# Patient Record
Sex: FEMALE | URBAN - METROPOLITAN AREA
[De-identification: names, ages, dates, MRNs, and addresses within clinical notes are randomized per-mention and may not be internally consistent; named-entity substitution may affect disease eponyms.]

---

## 2023-04-09 ENCOUNTER — EMERGENCY (EMERGENCY)
Facility: HOSPITAL | Age: 28
LOS: 1 days | Discharge: HOME | End: 2023-04-09
Attending: EMERGENCY MEDICINE
Payer: COMMERCIAL

## 2023-04-09 VITALS
OXYGEN SATURATION: 96 % | RESPIRATION RATE: 18 BRPM | WEIGHT: 111.99 LBS | DIASTOLIC BLOOD PRESSURE: 73 MMHG | HEART RATE: 103 BPM | HEIGHT: 65 IN | TEMPERATURE: 101 F | SYSTOLIC BLOOD PRESSURE: 128 MMHG

## 2023-04-09 LAB
ALBUMIN SERPL ELPH-MCNC: 4.6 G/DL — SIGNIFICANT CHANGE UP (ref 3.3–5)
ALP SERPL-CCNC: 65 U/L — SIGNIFICANT CHANGE UP (ref 40–120)
ALT FLD-CCNC: 10 U/L — SIGNIFICANT CHANGE UP (ref 10–45)
ANION GAP SERPL CALC-SCNC: 12 MMOL/L — SIGNIFICANT CHANGE UP (ref 5–17)
APPEARANCE UR: ABNORMAL
APTT BLD: 27.5 SEC — SIGNIFICANT CHANGE UP (ref 27.5–35.5)
AST SERPL-CCNC: 15 U/L — SIGNIFICANT CHANGE UP (ref 10–40)
BACTERIA # UR AUTO: ABNORMAL
BASE EXCESS BLDV CALC-SCNC: 2.9 MMOL/L — SIGNIFICANT CHANGE UP (ref -2–3)
BASOPHILS # BLD AUTO: 0.04 K/UL — SIGNIFICANT CHANGE UP (ref 0–0.2)
BASOPHILS NFR BLD AUTO: 0.2 % — SIGNIFICANT CHANGE UP (ref 0–2)
BILIRUB SERPL-MCNC: 1.4 MG/DL — HIGH (ref 0.2–1.2)
BILIRUB UR-MCNC: NEGATIVE — SIGNIFICANT CHANGE UP
BUN SERPL-MCNC: 12 MG/DL — SIGNIFICANT CHANGE UP (ref 7–23)
CA-I SERPL-SCNC: 1.2 MMOL/L — SIGNIFICANT CHANGE UP (ref 1.15–1.33)
CALCIUM SERPL-MCNC: 9.6 MG/DL — SIGNIFICANT CHANGE UP (ref 8.4–10.5)
CHLORIDE BLDV-SCNC: 100 MMOL/L — SIGNIFICANT CHANGE UP (ref 96–108)
CHLORIDE SERPL-SCNC: 98 MMOL/L — SIGNIFICANT CHANGE UP (ref 96–108)
CO2 BLDV-SCNC: 29 MMOL/L — HIGH (ref 22–26)
CO2 SERPL-SCNC: 26 MMOL/L — SIGNIFICANT CHANGE UP (ref 22–31)
COLOR SPEC: YELLOW — SIGNIFICANT CHANGE UP
CREAT SERPL-MCNC: 0.75 MG/DL — SIGNIFICANT CHANGE UP (ref 0.5–1.3)
DIFF PNL FLD: ABNORMAL
EGFR: 112 ML/MIN/1.73M2 — SIGNIFICANT CHANGE UP
EOSINOPHIL # BLD AUTO: 0.04 K/UL — SIGNIFICANT CHANGE UP (ref 0–0.5)
EOSINOPHIL NFR BLD AUTO: 0.2 % — SIGNIFICANT CHANGE UP (ref 0–6)
EPI CELLS # UR: 17 /HPF — HIGH
GAS PNL BLDV: 133 MMOL/L — LOW (ref 136–145)
GAS PNL BLDV: SIGNIFICANT CHANGE UP
GLUCOSE BLDV-MCNC: 88 MG/DL — SIGNIFICANT CHANGE UP (ref 70–99)
GLUCOSE SERPL-MCNC: 89 MG/DL — SIGNIFICANT CHANGE UP (ref 70–99)
GLUCOSE UR QL: NEGATIVE — SIGNIFICANT CHANGE UP
HCO3 BLDV-SCNC: 28 MMOL/L — SIGNIFICANT CHANGE UP (ref 22–29)
HCT VFR BLD CALC: 41.9 % — SIGNIFICANT CHANGE UP (ref 34.5–45)
HCT VFR BLDA CALC: 42 % — SIGNIFICANT CHANGE UP (ref 34.5–46.5)
HGB BLD CALC-MCNC: 14.1 G/DL — SIGNIFICANT CHANGE UP (ref 11.7–16.1)
HGB BLD-MCNC: 13.9 G/DL — SIGNIFICANT CHANGE UP (ref 11.5–15.5)
HYALINE CASTS # UR AUTO: 10 /LPF — HIGH (ref 0–2)
IMM GRANULOCYTES NFR BLD AUTO: 0.5 % — SIGNIFICANT CHANGE UP (ref 0–0.9)
INR BLD: 1.14 RATIO — SIGNIFICANT CHANGE UP (ref 0.88–1.16)
KETONES UR-MCNC: ABNORMAL
LACTATE BLDV-MCNC: 1.2 MMOL/L — SIGNIFICANT CHANGE UP (ref 0.5–2)
LEUKOCYTE ESTERASE UR-ACNC: ABNORMAL
LYMPHOCYTES # BLD AUTO: 0.6 K/UL — LOW (ref 1–3.3)
LYMPHOCYTES # BLD AUTO: 3.4 % — LOW (ref 13–44)
MCHC RBC-ENTMCNC: 30.9 PG — SIGNIFICANT CHANGE UP (ref 27–34)
MCHC RBC-ENTMCNC: 33.2 GM/DL — SIGNIFICANT CHANGE UP (ref 32–36)
MCV RBC AUTO: 93.1 FL — SIGNIFICANT CHANGE UP (ref 80–100)
MONOCYTES # BLD AUTO: 1.13 K/UL — HIGH (ref 0–0.9)
MONOCYTES NFR BLD AUTO: 6.4 % — SIGNIFICANT CHANGE UP (ref 2–14)
NEUTROPHILS # BLD AUTO: 15.64 K/UL — HIGH (ref 1.8–7.4)
NEUTROPHILS NFR BLD AUTO: 89.3 % — HIGH (ref 43–77)
NITRITE UR-MCNC: POSITIVE
NRBC # BLD: 0 /100 WBCS — SIGNIFICANT CHANGE UP (ref 0–0)
PCO2 BLDV: 43 MMHG — HIGH (ref 39–42)
PH BLDV: 7.42 — SIGNIFICANT CHANGE UP (ref 7.32–7.43)
PH UR: 6.5 — SIGNIFICANT CHANGE UP (ref 5–8)
PLATELET # BLD AUTO: 269 K/UL — SIGNIFICANT CHANGE UP (ref 150–400)
PO2 BLDV: 42 MMHG — SIGNIFICANT CHANGE UP (ref 25–45)
POTASSIUM BLDV-SCNC: 3.7 MMOL/L — SIGNIFICANT CHANGE UP (ref 3.5–5.1)
POTASSIUM SERPL-MCNC: 3.8 MMOL/L — SIGNIFICANT CHANGE UP (ref 3.5–5.3)
POTASSIUM SERPL-SCNC: 3.8 MMOL/L — SIGNIFICANT CHANGE UP (ref 3.5–5.3)
PROT SERPL-MCNC: 7.3 G/DL — SIGNIFICANT CHANGE UP (ref 6–8.3)
PROT UR-MCNC: ABNORMAL
PROTHROM AB SERPL-ACNC: 13.3 SEC — SIGNIFICANT CHANGE UP (ref 10.5–13.4)
RAPID RVP RESULT: SIGNIFICANT CHANGE UP
RBC # BLD: 4.5 M/UL — SIGNIFICANT CHANGE UP (ref 3.8–5.2)
RBC # FLD: 13 % — SIGNIFICANT CHANGE UP (ref 10.3–14.5)
RBC CASTS # UR COMP ASSIST: 4 /HPF — SIGNIFICANT CHANGE UP (ref 0–4)
SAO2 % BLDV: 70.8 % — SIGNIFICANT CHANGE UP (ref 67–88)
SARS-COV-2 RNA SPEC QL NAA+PROBE: SIGNIFICANT CHANGE UP
SODIUM SERPL-SCNC: 136 MMOL/L — SIGNIFICANT CHANGE UP (ref 135–145)
SP GR SPEC: 1.02 — SIGNIFICANT CHANGE UP (ref 1.01–1.02)
UROBILINOGEN FLD QL: NEGATIVE — SIGNIFICANT CHANGE UP
WBC # BLD: 17.54 K/UL — HIGH (ref 3.8–10.5)
WBC # FLD AUTO: 17.54 K/UL — HIGH (ref 3.8–10.5)
WBC UR QL: 111 /HPF — HIGH (ref 0–5)

## 2023-04-09 PROCEDURE — 71045 X-RAY EXAM CHEST 1 VIEW: CPT | Mod: 26

## 2023-04-09 PROCEDURE — 99223 1ST HOSP IP/OBS HIGH 75: CPT

## 2023-04-09 PROCEDURE — 74177 CT ABD & PELVIS W/CONTRAST: CPT | Mod: 26,MA

## 2023-04-09 RX ORDER — PIPERACILLIN AND TAZOBACTAM 4; .5 G/20ML; G/20ML
3.38 INJECTION, POWDER, LYOPHILIZED, FOR SOLUTION INTRAVENOUS ONCE
Refills: 0 | Status: COMPLETED | OUTPATIENT
Start: 2023-04-09 | End: 2023-04-09

## 2023-04-09 RX ORDER — ACETAMINOPHEN 500 MG
975 TABLET ORAL ONCE
Refills: 0 | Status: COMPLETED | OUTPATIENT
Start: 2023-04-09 | End: 2023-04-09

## 2023-04-09 RX ORDER — SODIUM CHLORIDE 9 MG/ML
1000 INJECTION, SOLUTION INTRAVENOUS ONCE
Refills: 0 | Status: COMPLETED | OUTPATIENT
Start: 2023-04-09 | End: 2023-04-09

## 2023-04-09 RX ORDER — KETOROLAC TROMETHAMINE 30 MG/ML
15 SYRINGE (ML) INJECTION ONCE
Refills: 0 | Status: DISCONTINUED | OUTPATIENT
Start: 2023-04-09 | End: 2023-04-09

## 2023-04-09 RX ORDER — SODIUM CHLORIDE 9 MG/ML
1000 INJECTION, SOLUTION INTRAVENOUS
Refills: 0 | Status: DISCONTINUED | OUTPATIENT
Start: 2023-04-09 | End: 2023-04-13

## 2023-04-09 RX ADMIN — SODIUM CHLORIDE 150 MILLILITER(S): 9 INJECTION, SOLUTION INTRAVENOUS at 23:28

## 2023-04-09 RX ADMIN — SODIUM CHLORIDE 1000 MILLILITER(S): 9 INJECTION, SOLUTION INTRAVENOUS at 17:51

## 2023-04-09 RX ADMIN — PIPERACILLIN AND TAZOBACTAM 3.38 GRAM(S): 4; .5 INJECTION, POWDER, LYOPHILIZED, FOR SOLUTION INTRAVENOUS at 17:35

## 2023-04-09 RX ADMIN — PIPERACILLIN AND TAZOBACTAM 200 GRAM(S): 4; .5 INJECTION, POWDER, LYOPHILIZED, FOR SOLUTION INTRAVENOUS at 17:05

## 2023-04-09 RX ADMIN — PIPERACILLIN AND TAZOBACTAM 25 GRAM(S): 4; .5 INJECTION, POWDER, LYOPHILIZED, FOR SOLUTION INTRAVENOUS at 19:35

## 2023-04-09 RX ADMIN — Medication 975 MILLIGRAM(S): at 17:04

## 2023-04-09 RX ADMIN — Medication 15 MILLIGRAM(S): at 20:38

## 2023-04-09 RX ADMIN — Medication 975 MILLIGRAM(S): at 17:34

## 2023-04-09 NOTE — ED CDU PROVIDER DISPOSITION NOTE - NSFOLLOWUPINSTRUCTIONS_ED_ALL_ED_FT
1) Follow-up with your Primary Medical Doctor or referred doctor. Call today / next business day for prompt follow-up.  2) Return to Emergency room for any worsening or persistent pain, weakness, fever, or any other concerning symptoms.  3) See attached instruction sheets for additional information, including information regarding signs and symptoms to look out for, reasons to seek immediate care and other important instructions.  4) Follow-up with any specialists as discussed / noted as well. 1. Follow-up with your Primary Medical Doctor within 2-3 days.   2. Rest. Stay hydrated with plenty of fluids.   3. Take Ibuprofen (i.e. Motrin, Advil) 600mg every 8 hrs for pain as needed. Take with food.   May alternate with Acetminophen (Tylenol) 650mg every 6 hours for pain as needed.  4. Begin taking Vantin 200 mg twice daily for 10 days.   5. Return to the emergency department if you have worsening pain, fevers, vomiting, inability to eat or drink or any other concerning symptoms.

## 2023-04-09 NOTE — ED PROVIDER NOTE - OBJECTIVE STATEMENT
27-year-old female history of lupus not currently on any medication (was on hydroxychloroquine and getting IVIG however has not had IVIG in 3 months) presenting to the emergency department for the evaluation of right sided flank and back pain and periumbilical pain beginning last night.  Patient reports that the pain has been constant, progressive.  Positive fever.  Reports nausea without vomiting, constipation (last BM 5 days ago).  Patient denies urinary discomfort, urinary frequency, unusual vaginal discharge.  Patient with an IUD sexually active with spouse.

## 2023-04-09 NOTE — ED ADULT NURSE NOTE - OBJECTIVE STATEMENT
A 26 y/o female axo x3, brought in from home complaining of abd pain and fever. PT report pain starting last night in RLQ that radiated to RUQ/Umbilicus and Right flank/back pain. PT reports having nausea w/o vomiting for 1 day and fever x1 day. Last BM was Tuesday. Past medical hx of SLE on IVIG Tx. PT denies headache, chest pain, SOB, diarrhea, urinary symptoms. PT breathing spontaneous, abd nondistended but tender, positive rebound tenderness @ right flank. PT reports vaginal spotting with hx IUD placement. PT placed on cardiac monitor with PIV placed, labs drawn and awaiting results. A 28 y/o female axo x3, brought in from home complaining of abd pain and fever. PT report pain starting last night in RLQ that radiated to RUQ/Umbilicus and Right flank/back pain. PT reports having nausea w/o vomiting for 1 day and fever x1 day. Last BM was Tuesday. Past medical hx of SLE on IVIG Tx. PT denies headache, chest pain, SOB, diarrhea, urinary symptoms. PT breathing spontaneous, abd nondistended but tender, positive rebound tenderness @ right flank. PT reports vaginal spotting with hx IUD placement. PT placed on cardiac monitor with PIV placed, labs drawn and awaiting results.

## 2023-04-09 NOTE — ED CDU PROVIDER INITIAL DAY NOTE - OBJECTIVE STATEMENT
27-year-old female history of lupus not currently on any medication (was on hydroxychloroquine and getting IVIG however has not had IVIG in 3 months) presenting to the emergency department for the evaluation of right sided flank and back pain and periumbilical pain beginning last night.  Patient reports that the pain has been constant, progressive.  Positive fever.  Reports nausea without vomiting, constipation (last BM 5 days ago).  Patient denies urinary discomfort, urinary frequency, unusual vaginal discharge.  Patient with an IUD sexually active with spouse.  In ED, patient febrile, tachycardiac w/ laboratory significant for leukocytosis. CT abdomen/pelvis w/ contrast showed Acute right pyelonephritis and ureteritis. Pt started on IV abx and sent to CDU for frequent reeval, vitals q 4hrs, iv abx, pain control.

## 2023-04-09 NOTE — ED ADULT NURSE NOTE - NSIMPLEMENTINTERV_GEN_ALL_ED
Implemented All Universal Safety Interventions:  [place] to call system. Call bell, personal items and telephone within reach. Instruct patient to call for assistance. Room bathroom lighting operational. Non-slip footwear when patient is off stretcher. Physically safe environment: no spills, clutter or unnecessary equipment. Stretcher in lowest position, wheels locked, appropriate side rails in place. Implemented All Universal Safety Interventions:  [place] to call system. Call bell, personal items and telephone within reach. Instruct patient to call for assistance. Room bathroom lighting operational. Non-slip footwear when patient is off stretcher. Physically safe environment: no spills, clutter or unnecessary equipment. Stretcher in lowest position, wheels locked, appropriate side rails in place. Implemented All Universal Safety Interventions:  [place] to call system. Call bell, personal items and telephone within reach. Instruct patient to call for assistance. Room bathroom lighting operational. Non-slip footwear when patient is off stretcher. Physically safe environment: no spills, clutter or unnecessary equipment. Stretcher in lowest position, wheels locked, appropriate side rails in place.

## 2023-04-09 NOTE — ED CDU PROVIDER DISPOSITION NOTE - CLINICAL COURSE
27-year-old female history of lupus not currently on any medication (was on hydroxychloroquine and getting IVIG however has not had IVIG in 3 months) presenting to the emergency department for the evaluation of right sided flank and back pain and periumbilical pain beginning last night.  Patient reports that the pain has been constant, progressive.  Positive fever.  Reports nausea without vomiting, constipation (last BM 5 days ago).  Patient denies urinary discomfort, urinary frequency, unusual vaginal discharge.  Patient with an IUD sexually active with spouse.  In ED, patient febrile, tachycardiac w/ laboratory significant for leukocytosis. CT abdomen/pelvis w/ contrast showed Acute right pyelonephritis and ureteritis. Pt started on IV abx and sent to CDU for frequent reeval, vitals q 4hrs, iv abx, pain control. 27-year-old female history of lupus not currently on any medication (was on hydroxychloroquine and getting IVIG however has not had IVIG in 3 months) presenting to the emergency department for the evaluation of right sided flank and back pain and periumbilical pain beginning last night.  Patient reports that the pain has been constant, progressive.  Positive fever.  Reports nausea without vomiting, constipation (last BM 5 days ago).  Patient denies urinary discomfort, urinary frequency, unusual vaginal discharge.  Patient with an IUD sexually active with spouse.  In ED, patient febrile, tachycardiac w/ laboratory significant for leukocytosis. CT abdomen/pelvis w/ contrast showed Acute right pyelonephritis and ureteritis. Pt started on IV abx and sent to CDU for frequent reeval, vitals q 4hrs, iv abx, pain control.     While in CDU patient had multiple doses of IV ABX and pain control. Symptoms improved. Stable for discharge.

## 2023-04-09 NOTE — ED CLERICAL - DIVISION
[facility]... [facility]... [facility]...

## 2023-04-09 NOTE — ED PROVIDER NOTE - NS ED ATTENDING STATEMENT MOD
Attending with This was a shared visit with the FITO. I reviewed and verified the documentation and independently performed the documented:

## 2023-04-09 NOTE — ED CDU PROVIDER DISPOSITION NOTE - PATIENT PORTAL LINK FT
You can access the FollowMyHealth Patient Portal offered by [facility] by registering at the following website: [email or web address]. By joining [employer]’s FollowMyHealth portal, you will also be able to view your health information using other applications (apps) compatible with our system. You can access the FollowMyHealth Patient Portal offered by [facility] by registering at the following website: [email or web address]. By joining [employer]’s FollowMyHealth portal, you will also be able to view your health information using other applications (apps) compatible with our system. You can access the FollowMyHealth Patient Portal offered by [facility] by registering at the following website: [email or web address]. By joining [employer]’s FollowMyHealth portal, you will also be able to view your health information using other applications (apps) compatible with our system.

## 2023-04-09 NOTE — ED PROVIDER NOTE - ATTENDING CONTRIBUTION TO CARE
Attending Statement (JOSE Perea MD):    HPI: 28y/o F with h/o SLE (reports having ivig in past, not on any other medications, last ivig ~3 mo/ago), presenting with abdominal pain since last night; reports pain began in the mid upper abdomen (indicates periumbilical to epigastric region) and has now moved to the right lower abdomen with radiation to the right flank/lower back. No urinary symptoms. No vomiting/diarrhea. +fever.    Review of Systems:  -General: +fever +chills  -ENT: no congestion, no difficulty swallowing  -Pulmonary: no cough, no shortness of breath  -Cardiac: no chest pain, no palpitations  -Gastrointestinal: +abdominal pain,  no vomiting, and no diarrhea.  -Genitourinary: no blood or pain with urination  -Musculoskeletal: no back or neck pain  -Skin: no rashes  -Endocrine: No h/o diabetes   -Neurologic: No new weakness or numbness in extremities    All else negative unless otherwise specified elsewhere in this note.    PSH/PMH as noted above    On Physical Exam:  General: well appearing, in NAD, speaking clearly in full sentences and without difficulty; cooperative with exam  HEENT: anicteric sclera, airway patent  Neck: no JVD  Cardiac: s1s2 tachycardic/regular  Lungs: CTABL  Abdomen: periumbilical, suprapubic and RLQ tenderness; no RUQ tenderness, no L sided tenderness; no rebound/guarding  Skin: intact, no rash  Extremities: no peripheral edema, no gross deformities    MDM: 28y/o F with h/o SLE p/w periumbilical-right sided abd pain with fever; concerning for possible appendicitis; will obtain screening labs: cbc (to evaluate for leukocytosis or anemia), CMP (to evaluate for electrolyte abnormalities or renal/liver dysfunction), lipase (to evaluate for pancreatitis), hcg/ucg (r/o pregnancy) and UA (eval for uti/cystitis; no CVA tenderness present on exam); pain medication as needed; obtain CT A/P with oral and iv contrast to further evaluate. Mild tachycardia and febrile; concerning for possible early sepsis, will start empiric iv antibiotics (zosyn) and fluids. Please see above progress notes above for updates to medical decision making and the patient's clinical course. Attending Statement (JOSE Perea MD):    HPI: 26y/o F with h/o SLE (reports having ivig in past, not on any other medications, last ivig ~3 mo/ago), presenting with abdominal pain since last night; reports pain began in the mid upper abdomen (indicates periumbilical to epigastric region) and has now moved to the right lower abdomen with radiation to the right flank/lower back. No urinary symptoms. No vomiting/diarrhea. +fever.    Review of Systems:  -General: +fever +chills  -ENT: no congestion, no difficulty swallowing  -Pulmonary: no cough, no shortness of breath  -Cardiac: no chest pain, no palpitations  -Gastrointestinal: +abdominal pain,  no vomiting, and no diarrhea.  -Genitourinary: no blood or pain with urination  -Musculoskeletal: no back or neck pain  -Skin: no rashes  -Endocrine: No h/o diabetes   -Neurologic: No new weakness or numbness in extremities    All else negative unless otherwise specified elsewhere in this note.    PSH/PMH as noted above    On Physical Exam:  General: well appearing, in NAD, speaking clearly in full sentences and without difficulty; cooperative with exam  HEENT: anicteric sclera, airway patent  Neck: no JVD  Cardiac: s1s2 tachycardic/regular  Lungs: CTABL  Abdomen: periumbilical, suprapubic and RLQ tenderness; no RUQ tenderness, no L sided tenderness; no rebound/guarding  Skin: intact, no rash  Extremities: no peripheral edema, no gross deformities    MDM: 26y/o F with h/o SLE p/w periumbilical-right sided abd pain with fever; concerning for possible appendicitis; will obtain screening labs: cbc (to evaluate for leukocytosis or anemia), CMP (to evaluate for electrolyte abnormalities or renal/liver dysfunction), lipase (to evaluate for pancreatitis), hcg/ucg (r/o pregnancy) and UA (eval for uti/cystitis; no CVA tenderness present on exam); pain medication as needed; obtain CT A/P with oral and iv contrast to further evaluate. Mild tachycardia and febrile; concerning for possible early sepsis, will start empiric iv antibiotics (zosyn) and fluids. Please see above progress notes above for updates to medical decision making and the patient's clinical course.

## 2023-04-09 NOTE — ED PROVIDER NOTE - CHILD ABUSE FACILITY
[facility]

## 2023-04-09 NOTE — ED ADULT NURSE REASSESSMENT NOTE - NS ED NURSE REASSESS COMMENT FT1
Pt received from AGUSTIN Owens. Pt oriented to CDU & plan of care was discussed. Pt A&O x 4. Pt in CDU for ABX, IV fluid and pain control . Pt complained of headache. Ofirmev will be administered as ordered. V/S stable, pt afebrile,  IV in place, patent and free of signs of infiltration. Pt resting in bed. Safety & comfort measures maintained. Call bell in reach. Will continue to monitor. lactated ringers running at 150ml/hr via pump.

## 2023-04-09 NOTE — ED CDU PROVIDER DISPOSITION NOTE - ATTENDING APP SHARED VISIT CONTRIBUTION OF CARE
I, Aashish Jj, performed a history and physical exam of the patient and discussed their management with the resident and/or advanced care provider. I reviewed the resident and/or advanced care provider's note and agree with the documented findings and plan of care. I was present and available for all procedures.    27-year-old female history of lupus not currently on any medication (was on hydroxychloroquine and getting IVIG however has not had IVIG in 3 months) presenting to the emergency department for the evaluation of right sided flank and back pain and periumbilical pain beginning last night.  Patient reports that the pain has been constant, progressive.  Positive fever.  Reports nausea without vomiting, constipation (last BM 5 days ago).  Patient denies urinary discomfort, urinary frequency, unusual vaginal discharge.  Patient with an IUD sexually active with spouse.    In ED, patient febrile, tachycardiac w/ laboratory significant for leukocytosis. CT abdomen/pelvis w/ contrast showed Acute right pyelonephritis and ureteritis. Pt started on IV abx and sent to CDU for frequent reeval, vitals q 4hrs, iv abx, pain control.

## 2023-04-09 NOTE — ED CDU PROVIDER INITIAL DAY NOTE - DETAILS
27-year-old female history of lupus (not on meds) p/w Acute right pyelonephritis and ureteritis  Plan: frequent reeval, vitals q 4hrs, iv abx, pain control

## 2023-04-09 NOTE — ED CDU PROVIDER INITIAL DAY NOTE - ATTENDING APP SHARED VISIT CONTRIBUTION OF CARE
Attending Statement (JOSE Perea MD):    HPI: 26y/o F with h/o SLE (reports having ivig in past, not on any other medications, last ivig ~3 mo/ago), presenting with abdominal pain since last night; reports pain began in the mid upper abdomen (indicates periumbilical to epigastric region) and has now moved to the right lower abdomen with radiation to the right flank/lower back. No urinary symptoms. No vomiting/diarrhea. +fever.    Review of Systems:  -General: +fever +chills  -ENT: no congestion, no difficulty swallowing  -Pulmonary: no cough, no shortness of breath  -Cardiac: no chest pain, no palpitations  -Gastrointestinal: +abdominal pain,  no vomiting, and no diarrhea.  -Genitourinary: no blood or pain with urination  -Musculoskeletal: no back or neck pain  -Skin: no rashes  -Endocrine: No h/o diabetes   -Neurologic: No new weakness or numbness in extremities    All else negative unless otherwise specified elsewhere in this note.    PSH/PMH as noted above    On Physical Exam:  General: well appearing, in NAD, speaking clearly in full sentences and without difficulty; cooperative with exam  HEENT: anicteric sclera, airway patent  Neck: no JVD  Cardiac: s1s2 tachycardic/regular  Lungs: CTABL  Abdomen: periumbilical, suprapubic and RLQ tenderness; no RUQ tenderness, no L sided tenderness; no rebound/guarding  Skin: intact, no rash  Extremities: no peripheral edema, no gross deformities    MDM: 26y/o F with h/o SLE p/w periumbilical-right sided abd pain with fever; found on CT to have findings consistent with pyelonephritis; labs reviewed; plan now for observation in CDU given some concern initially for sepsis, though improved during clinical course with iv fluids, antibiotics; will continued iv fluids/antibiotics, and reassess in AM for possible discharge home on PO ABx. Attending Statement (JOSE Perea MD):    HPI: 28y/o F with h/o SLE (reports having ivig in past, not on any other medications, last ivig ~3 mo/ago), presenting with abdominal pain since last night; reports pain began in the mid upper abdomen (indicates periumbilical to epigastric region) and has now moved to the right lower abdomen with radiation to the right flank/lower back. No urinary symptoms. No vomiting/diarrhea. +fever.    Review of Systems:  -General: +fever +chills  -ENT: no congestion, no difficulty swallowing  -Pulmonary: no cough, no shortness of breath  -Cardiac: no chest pain, no palpitations  -Gastrointestinal: +abdominal pain,  no vomiting, and no diarrhea.  -Genitourinary: no blood or pain with urination  -Musculoskeletal: no back or neck pain  -Skin: no rashes  -Endocrine: No h/o diabetes   -Neurologic: No new weakness or numbness in extremities    All else negative unless otherwise specified elsewhere in this note.    PSH/PMH as noted above    On Physical Exam:  General: well appearing, in NAD, speaking clearly in full sentences and without difficulty; cooperative with exam  HEENT: anicteric sclera, airway patent  Neck: no JVD  Cardiac: s1s2 tachycardic/regular  Lungs: CTABL  Abdomen: periumbilical, suprapubic and RLQ tenderness; no RUQ tenderness, no L sided tenderness; no rebound/guarding  Skin: intact, no rash  Extremities: no peripheral edema, no gross deformities    MDM: 28y/o F with h/o SLE p/w periumbilical-right sided abd pain with fever; found on CT to have findings consistent with pyelonephritis; labs reviewed; plan now for observation in CDU given some concern initially for sepsis, though improved during clinical course with iv fluids, antibiotics; will continued iv fluids/antibiotics, and reassess in AM for possible discharge home on PO ABx.

## 2023-04-10 VITALS
TEMPERATURE: 99 F | OXYGEN SATURATION: 99 % | DIASTOLIC BLOOD PRESSURE: 69 MMHG | HEART RATE: 76 BPM | RESPIRATION RATE: 17 BRPM | SYSTOLIC BLOOD PRESSURE: 105 MMHG

## 2023-04-10 LAB
-  CTX-M RESISTANCE MARKER: SIGNIFICANT CHANGE UP
-  ESBL: SIGNIFICANT CHANGE UP
ANION GAP SERPL CALC-SCNC: 9 MMOL/L — SIGNIFICANT CHANGE UP (ref 5–17)
BASOPHILS # BLD AUTO: 0.05 K/UL — SIGNIFICANT CHANGE UP (ref 0–0.2)
BASOPHILS NFR BLD AUTO: 0.3 % — SIGNIFICANT CHANGE UP (ref 0–2)
BASOPHILS NFR BLD AUTO: 0.4 % — SIGNIFICANT CHANGE UP (ref 0–2)
BUN SERPL-MCNC: 12 MG/DL — SIGNIFICANT CHANGE UP (ref 7–23)
CALCIUM SERPL-MCNC: 9.4 MG/DL — SIGNIFICANT CHANGE UP (ref 8.4–10.5)
CHLORIDE SERPL-SCNC: 102 MMOL/L — SIGNIFICANT CHANGE UP (ref 96–108)
CO2 SERPL-SCNC: 26 MMOL/L — SIGNIFICANT CHANGE UP (ref 22–31)
CREAT SERPL-MCNC: 0.87 MG/DL — SIGNIFICANT CHANGE UP (ref 0.5–1.3)
E COLI DNA BLD POS QL NAA+NON-PROBE: SIGNIFICANT CHANGE UP
EGFR: 94 ML/MIN/1.73M2 — SIGNIFICANT CHANGE UP
EOSINOPHIL # BLD AUTO: 0.02 K/UL — SIGNIFICANT CHANGE UP (ref 0–0.5)
EOSINOPHIL # BLD AUTO: 0.08 K/UL — SIGNIFICANT CHANGE UP (ref 0–0.5)
EOSINOPHIL NFR BLD AUTO: 0.1 % — SIGNIFICANT CHANGE UP (ref 0–6)
EOSINOPHIL NFR BLD AUTO: 0.6 % — SIGNIFICANT CHANGE UP (ref 0–6)
GLUCOSE SERPL-MCNC: 114 MG/DL — HIGH (ref 70–99)
GRAM STN FLD: SIGNIFICANT CHANGE UP
HCT VFR BLD CALC: 39.2 % — SIGNIFICANT CHANGE UP (ref 34.5–45)
HCT VFR BLD CALC: 39.9 % — SIGNIFICANT CHANGE UP (ref 34.5–45)
HGB BLD-MCNC: 12.6 G/DL — SIGNIFICANT CHANGE UP (ref 11.5–15.5)
HGB BLD-MCNC: 13 G/DL — SIGNIFICANT CHANGE UP (ref 11.5–15.5)
IMM GRANULOCYTES NFR BLD AUTO: 0.6 % — SIGNIFICANT CHANGE UP (ref 0–0.9)
IMM GRANULOCYTES NFR BLD AUTO: 0.8 % — SIGNIFICANT CHANGE UP (ref 0–0.9)
LYMPHOCYTES # BLD AUTO: 0.72 K/UL — LOW (ref 1–3.3)
LYMPHOCYTES # BLD AUTO: 1.23 K/UL — SIGNIFICANT CHANGE UP (ref 1–3.3)
LYMPHOCYTES # BLD AUTO: 4 % — LOW (ref 13–44)
LYMPHOCYTES # BLD AUTO: 8.6 % — LOW (ref 13–44)
MCHC RBC-ENTMCNC: 30.5 PG — SIGNIFICANT CHANGE UP (ref 27–34)
MCHC RBC-ENTMCNC: 30.8 PG — SIGNIFICANT CHANGE UP (ref 27–34)
MCHC RBC-ENTMCNC: 32.1 GM/DL — SIGNIFICANT CHANGE UP (ref 32–36)
MCHC RBC-ENTMCNC: 32.6 GM/DL — SIGNIFICANT CHANGE UP (ref 32–36)
MCV RBC AUTO: 94.5 FL — SIGNIFICANT CHANGE UP (ref 80–100)
MCV RBC AUTO: 94.9 FL — SIGNIFICANT CHANGE UP (ref 80–100)
METHOD TYPE: SIGNIFICANT CHANGE UP
MONOCYTES # BLD AUTO: 1.12 K/UL — HIGH (ref 0–0.9)
MONOCYTES # BLD AUTO: 1.18 K/UL — HIGH (ref 0–0.9)
MONOCYTES NFR BLD AUTO: 6.5 % — SIGNIFICANT CHANGE UP (ref 2–14)
MONOCYTES NFR BLD AUTO: 7.9 % — SIGNIFICANT CHANGE UP (ref 2–14)
NEUTROPHILS # BLD AUTO: 11.67 K/UL — HIGH (ref 1.8–7.4)
NEUTROPHILS # BLD AUTO: 15.97 K/UL — HIGH (ref 1.8–7.4)
NEUTROPHILS NFR BLD AUTO: 81.9 % — HIGH (ref 43–77)
NEUTROPHILS NFR BLD AUTO: 88.3 % — HIGH (ref 43–77)
NRBC # BLD: 0 /100 WBCS — SIGNIFICANT CHANGE UP (ref 0–0)
PLATELET # BLD AUTO: 215 K/UL — SIGNIFICANT CHANGE UP (ref 150–400)
PLATELET # BLD AUTO: 245 K/UL — SIGNIFICANT CHANGE UP (ref 150–400)
POTASSIUM SERPL-MCNC: 4.4 MMOL/L — SIGNIFICANT CHANGE UP (ref 3.5–5.3)
POTASSIUM SERPL-SCNC: 4.4 MMOL/L — SIGNIFICANT CHANGE UP (ref 3.5–5.3)
RBC # BLD: 4.13 M/UL — SIGNIFICANT CHANGE UP (ref 3.8–5.2)
RBC # BLD: 4.22 M/UL — SIGNIFICANT CHANGE UP (ref 3.8–5.2)
RBC # FLD: 13.2 % — SIGNIFICANT CHANGE UP (ref 10.3–14.5)
SODIUM SERPL-SCNC: 137 MMOL/L — SIGNIFICANT CHANGE UP (ref 135–145)
SPECIMEN SOURCE: SIGNIFICANT CHANGE UP
WBC # BLD: 14.24 K/UL — HIGH (ref 3.8–10.5)
WBC # BLD: 18.08 K/UL — HIGH (ref 3.8–10.5)
WBC # FLD AUTO: 14.24 K/UL — HIGH (ref 3.8–10.5)
WBC # FLD AUTO: 18.08 K/UL — HIGH (ref 3.8–10.5)

## 2023-04-10 PROCEDURE — 87086 URINE CULTURE/COLONY COUNT: CPT

## 2023-04-10 PROCEDURE — 0225U NFCT DS DNA&RNA 21 SARSCOV2: CPT

## 2023-04-10 PROCEDURE — 93005 ELECTROCARDIOGRAM TRACING: CPT

## 2023-04-10 PROCEDURE — 96365 THER/PROPH/DIAG IV INF INIT: CPT | Mod: XU

## 2023-04-10 PROCEDURE — 87040 BLOOD CULTURE FOR BACTERIA: CPT

## 2023-04-10 PROCEDURE — G0378: CPT

## 2023-04-10 PROCEDURE — 87077 CULTURE AEROBIC IDENTIFY: CPT

## 2023-04-10 PROCEDURE — 36415 COLL VENOUS BLD VENIPUNCTURE: CPT

## 2023-04-10 PROCEDURE — 84132 ASSAY OF SERUM POTASSIUM: CPT

## 2023-04-10 PROCEDURE — 85730 THROMBOPLASTIN TIME PARTIAL: CPT

## 2023-04-10 PROCEDURE — 87186 SC STD MICRODIL/AGAR DIL: CPT

## 2023-04-10 PROCEDURE — 96376 TX/PRO/DX INJ SAME DRUG ADON: CPT

## 2023-04-10 PROCEDURE — 80053 COMPREHEN METABOLIC PANEL: CPT

## 2023-04-10 PROCEDURE — 87150 DNA/RNA AMPLIFIED PROBE: CPT

## 2023-04-10 PROCEDURE — 96375 TX/PRO/DX INJ NEW DRUG ADDON: CPT

## 2023-04-10 PROCEDURE — 82330 ASSAY OF CALCIUM: CPT

## 2023-04-10 PROCEDURE — 82803 BLOOD GASES ANY COMBINATION: CPT

## 2023-04-10 PROCEDURE — 85014 HEMATOCRIT: CPT

## 2023-04-10 PROCEDURE — 99238 HOSP IP/OBS DSCHRG MGMT 30/<: CPT

## 2023-04-10 PROCEDURE — 80048 BASIC METABOLIC PNL TOTAL CA: CPT

## 2023-04-10 PROCEDURE — 81001 URINALYSIS AUTO W/SCOPE: CPT

## 2023-04-10 PROCEDURE — 82947 ASSAY GLUCOSE BLOOD QUANT: CPT

## 2023-04-10 PROCEDURE — 99285 EMERGENCY DEPT VISIT HI MDM: CPT | Mod: 25

## 2023-04-10 PROCEDURE — 85018 HEMOGLOBIN: CPT

## 2023-04-10 PROCEDURE — 74177 CT ABD & PELVIS W/CONTRAST: CPT | Mod: MA

## 2023-04-10 PROCEDURE — 85025 COMPLETE CBC W/AUTO DIFF WBC: CPT

## 2023-04-10 PROCEDURE — 85610 PROTHROMBIN TIME: CPT

## 2023-04-10 PROCEDURE — 82435 ASSAY OF BLOOD CHLORIDE: CPT

## 2023-04-10 PROCEDURE — 83605 ASSAY OF LACTIC ACID: CPT

## 2023-04-10 PROCEDURE — 71045 X-RAY EXAM CHEST 1 VIEW: CPT

## 2023-04-10 PROCEDURE — 84295 ASSAY OF SERUM SODIUM: CPT

## 2023-04-10 RX ORDER — ACETAMINOPHEN 500 MG
975 TABLET ORAL ONCE
Refills: 0 | Status: COMPLETED | OUTPATIENT
Start: 2023-04-10 | End: 2023-04-10

## 2023-04-10 RX ORDER — KETOROLAC TROMETHAMINE 30 MG/ML
15 SYRINGE (ML) INJECTION ONCE
Refills: 0 | Status: DISCONTINUED | OUTPATIENT
Start: 2023-04-10 | End: 2023-04-10

## 2023-04-10 RX ORDER — CEFTRIAXONE 500 MG/1
1000 INJECTION, POWDER, FOR SOLUTION INTRAMUSCULAR; INTRAVENOUS ONCE
Refills: 0 | Status: COMPLETED | OUTPATIENT
Start: 2023-04-10 | End: 2023-04-10

## 2023-04-10 RX ORDER — ACETAMINOPHEN 500 MG
1000 TABLET ORAL ONCE
Refills: 0 | Status: COMPLETED | OUTPATIENT
Start: 2023-04-10 | End: 2023-04-10

## 2023-04-10 RX ORDER — SODIUM CHLORIDE 9 MG/ML
1000 INJECTION INTRAMUSCULAR; INTRAVENOUS; SUBCUTANEOUS ONCE
Refills: 0 | Status: COMPLETED | OUTPATIENT
Start: 2023-04-10 | End: 2023-04-10

## 2023-04-10 RX ORDER — CEFPODOXIME PROXETIL 100 MG
1 TABLET ORAL
Qty: 20 | Refills: 0
Start: 2023-04-10 | End: 2023-04-19

## 2023-04-10 RX ORDER — ACETAMINOPHEN 500 MG
650 TABLET ORAL ONCE
Refills: 0 | Status: COMPLETED | OUTPATIENT
Start: 2023-04-10 | End: 2023-04-10

## 2023-04-10 RX ADMIN — Medication 15 MILLIGRAM(S): at 05:48

## 2023-04-10 RX ADMIN — Medication 975 MILLIGRAM(S): at 10:23

## 2023-04-10 RX ADMIN — SODIUM CHLORIDE 150 MILLILITER(S): 9 INJECTION, SOLUTION INTRAVENOUS at 10:23

## 2023-04-10 RX ADMIN — CEFTRIAXONE 1000 MILLIGRAM(S): 500 INJECTION, POWDER, FOR SOLUTION INTRAMUSCULAR; INTRAVENOUS at 03:47

## 2023-04-10 RX ADMIN — CEFTRIAXONE 100 MILLIGRAM(S): 500 INJECTION, POWDER, FOR SOLUTION INTRAMUSCULAR; INTRAVENOUS at 03:17

## 2023-04-10 RX ADMIN — Medication 650 MILLIGRAM(S): at 15:39

## 2023-04-10 RX ADMIN — Medication 400 MILLIGRAM(S): at 00:18

## 2023-04-10 RX ADMIN — SODIUM CHLORIDE 1000 MILLILITER(S): 9 INJECTION INTRAMUSCULAR; INTRAVENOUS; SUBCUTANEOUS at 10:22

## 2023-04-10 RX ADMIN — CEFTRIAXONE 100 MILLIGRAM(S): 500 INJECTION, POWDER, FOR SOLUTION INTRAMUSCULAR; INTRAVENOUS at 13:00

## 2023-04-10 NOTE — ED ADULT NURSE REASSESSMENT NOTE - NSIMPLEMENTINTERV_GEN_ALL_ED
Implemented All Universal Safety Interventions:  [place] to call system. Call bell, personal items and telephone within reach. Instruct patient to call for assistance. Room bathroom lighting operational. Non-slip footwear when patient is off stretcher. Physically safe environment: no spills, clutter or unnecessary equipment. Stretcher in lowest position, wheels locked, appropriate side rails in place. Implemented All Universal Safety Interventions:  [place] to call system. Call bell, personal items and telephone within reach. Instruct patient to call for assistance. Room bathroom lighting operational. Non-slip footwear when patient is off stretcher. Physically safe environment: no spills, clutter or unnecessary equipment. Stretcher in lowest position, wheels locked, appropriate side rails in place. Implemented All Universal Safety Interventions:  [place] to call system. Call bell, personal items and telephone within reach. Instruct patient to call for assistance. Room bathroom lighting operational. Non-slip footwear when patient is off stretcher. Physically safe environment: no spills, clutter or unnecessary equipment. Stretcher in lowest position, wheels locked, appropriate side rails in place.

## 2023-04-10 NOTE — ED CDU PROVIDER SUBSEQUENT DAY NOTE - HISTORY
CDU PROGRESS NOTE PA HARJIT: Pt resting comfortably, NAD. On exam, + right CVAT. Abdomen soft, non distended, no rebound or guarding. Will closely monitor and f/u am labs.

## 2023-04-10 NOTE — ED ADULT NURSE REASSESSMENT NOTE - NS ED NURSE REASSESS COMMENT FT1
16.30 Pt is evaluated by CDU MD Parviz Dunn . pt is feeling better.  Pt is discharged . Ml out  ROXANE Reynoso  explained the follow up care & gave the discharge summary  . Pt has stable vitals steady gait A&OX 4 at the time of Discharge

## 2023-04-10 NOTE — ED CDU PROVIDER SUBSEQUENT DAY NOTE - PROGRESS NOTE DETAILS
CDU PROGRESS NOTE PA HARJIT: Pt resting in stretcher, NAD, VSS. Pt c/o worsening headache and right back pain. On exam, + right CVAT, abdomen soft, non distended, no rebound or guarding. Will provide analgesia and reassess. Patient evaluated at bedside. States that she has persistent back pain. Afebrile overnight. VSS. Pending repeat labs this AM. Will continue ABX and reassessment. Will give IVF. Angeles Hill PA-C Patient received 8 hour dose of IV rocephin. CBC repeated which revealed downtrending WBC. Patient feels well. Pain has improved. VSS. She is comfortable with plan for d/c home. CDU attending evaluated at bedside and agrees with plan. Stable for d/c. Angeles Hill PA-C

## 2023-04-10 NOTE — ED ADULT NURSE REASSESSMENT NOTE - NS ED NURSE REASSESS COMMENT FT1
07.00 Am Received the Pt from  AGUSTIN Gregg . Pt is Observed for Pyelonephritis. Received the Pt A&OX 4 obeys commands Jaenlle N/V/D fever chills cp SOB   Comfort care & safety measures continued  IV site looks clean & dry no signs of infiltration noted pt denies  pain IV site .  Pt is advised to call for help  call bell with in the reach pt verbalized the understanding .  pending CDU  MD bolton . GCS 15/15 A&OX 4 PERRLA  size 3 Strong upper & lower extremities steady gait   No facial droop  No Hand Leg drop denies numbness tingling C/O lower back pain medicated as per order Continue to monitor 07.00 Am Received the Pt from  AGUSTIN Gregg . Pt is Observed for Pyelonephritis. Received the Pt A&OX 4 obeys commands Janelle N/V/D fever chills cp SOB   Comfort care & safety measures continued  IV site looks clean & dry no signs of infiltration noted pt denies  pain IV site .  Pt is advised to call for help  call bell with in the reach pt verbalized the understanding .  pending CDU  MD bolton . GCS 15/15 A&OX 4 PERRLA  size 3 Strong upper & lower extremities steady gait   No facial droop  No Hand Leg drop denies numbness tingling C/O lower back pain medicated as per order Continue to monitor

## 2023-04-11 ENCOUNTER — INPATIENT (INPATIENT)
Facility: HOSPITAL | Age: 28
LOS: 2 days | Discharge: HOME | DRG: 690 | End: 2023-04-14
Attending: HOSPITALIST | Admitting: HOSPITALIST
Payer: COMMERCIAL

## 2023-04-11 VITALS
WEIGHT: 117.95 LBS | DIASTOLIC BLOOD PRESSURE: 61 MMHG | OXYGEN SATURATION: 97 % | TEMPERATURE: 100 F | SYSTOLIC BLOOD PRESSURE: 100 MMHG | HEIGHT: 65 IN | HEART RATE: 80 BPM | RESPIRATION RATE: 20 BRPM

## 2023-04-11 DIAGNOSIS — Z98.82 BREAST IMPLANT STATUS: Chronic | ICD-10-CM

## 2023-04-11 DIAGNOSIS — M32.9 SYSTEMIC LUPUS ERYTHEMATOSUS, UNSPECIFIED: ICD-10-CM

## 2023-04-11 DIAGNOSIS — Z29.9 ENCOUNTER FOR PROPHYLACTIC MEASURES, UNSPECIFIED: ICD-10-CM

## 2023-04-11 DIAGNOSIS — R78.81 BACTEREMIA: ICD-10-CM

## 2023-04-11 DIAGNOSIS — N12 TUBULO-INTERSTITIAL NEPHRITIS, NOT SPECIFIED AS ACUTE OR CHRONIC: ICD-10-CM

## 2023-04-11 DIAGNOSIS — Z90.89 ACQUIRED ABSENCE OF OTHER ORGANS: Chronic | ICD-10-CM

## 2023-04-11 LAB
-  AMIKACIN: SIGNIFICANT CHANGE UP
-  AMOXICILLIN/CLAVULANIC ACID: SIGNIFICANT CHANGE UP
-  AMPICILLIN/SULBACTAM: SIGNIFICANT CHANGE UP
-  AMPICILLIN: SIGNIFICANT CHANGE UP
-  AZTREONAM: SIGNIFICANT CHANGE UP
-  CEFAZOLIN: SIGNIFICANT CHANGE UP
-  CEFEPIME: SIGNIFICANT CHANGE UP
-  CEFTRIAXONE: SIGNIFICANT CHANGE UP
-  CEFUROXIME: SIGNIFICANT CHANGE UP
-  CIPROFLOXACIN: SIGNIFICANT CHANGE UP
-  ERTAPENEM: SIGNIFICANT CHANGE UP
-  GENTAMICIN: SIGNIFICANT CHANGE UP
-  IMIPENEM: SIGNIFICANT CHANGE UP
-  LEVOFLOXACIN: SIGNIFICANT CHANGE UP
-  MEROPENEM: SIGNIFICANT CHANGE UP
-  NITROFURANTOIN: SIGNIFICANT CHANGE UP
-  PIPERACILLIN/TAZOBACTAM: SIGNIFICANT CHANGE UP
-  TOBRAMYCIN: SIGNIFICANT CHANGE UP
-  TRIMETHOPRIM/SULFAMETHOXAZOLE: SIGNIFICANT CHANGE UP
ALBUMIN SERPL ELPH-MCNC: 3.6 G/DL — SIGNIFICANT CHANGE UP (ref 3.3–5)
ALP SERPL-CCNC: 71 U/L — SIGNIFICANT CHANGE UP (ref 40–120)
ALT FLD-CCNC: 19 U/L — SIGNIFICANT CHANGE UP (ref 10–45)
ANION GAP SERPL CALC-SCNC: 10 MMOL/L — SIGNIFICANT CHANGE UP (ref 5–17)
APPEARANCE UR: CLEAR — SIGNIFICANT CHANGE UP
APTT BLD: 27.7 SEC — SIGNIFICANT CHANGE UP (ref 27.5–35.5)
AST SERPL-CCNC: 26 U/L — SIGNIFICANT CHANGE UP (ref 10–40)
BACTERIA # UR AUTO: ABNORMAL
BASE EXCESS BLDV CALC-SCNC: -1.7 MMOL/L — SIGNIFICANT CHANGE UP (ref -2–3)
BASOPHILS # BLD AUTO: 0.03 K/UL — SIGNIFICANT CHANGE UP (ref 0–0.2)
BASOPHILS NFR BLD AUTO: 0.2 % — SIGNIFICANT CHANGE UP (ref 0–2)
BILIRUB SERPL-MCNC: 0.5 MG/DL — SIGNIFICANT CHANGE UP (ref 0.2–1.2)
BILIRUB UR-MCNC: NEGATIVE — SIGNIFICANT CHANGE UP
BUN SERPL-MCNC: 9 MG/DL — SIGNIFICANT CHANGE UP (ref 7–23)
CA-I SERPL-SCNC: 1.22 MMOL/L — SIGNIFICANT CHANGE UP (ref 1.15–1.33)
CALCIUM SERPL-MCNC: 8.6 MG/DL — SIGNIFICANT CHANGE UP (ref 8.4–10.5)
CHLORIDE BLDV-SCNC: 102 MMOL/L — SIGNIFICANT CHANGE UP (ref 96–108)
CHLORIDE SERPL-SCNC: 102 MMOL/L — SIGNIFICANT CHANGE UP (ref 96–108)
CO2 BLDV-SCNC: 24 MMOL/L — SIGNIFICANT CHANGE UP (ref 22–26)
CO2 SERPL-SCNC: 23 MMOL/L — SIGNIFICANT CHANGE UP (ref 22–31)
COLOR SPEC: YELLOW — SIGNIFICANT CHANGE UP
CREAT SERPL-MCNC: 0.69 MG/DL — SIGNIFICANT CHANGE UP (ref 0.5–1.3)
CULTURE RESULTS: SIGNIFICANT CHANGE UP
DIFF PNL FLD: ABNORMAL
EGFR: 122 ML/MIN/1.73M2 — SIGNIFICANT CHANGE UP
EOSINOPHIL # BLD AUTO: 0 K/UL — SIGNIFICANT CHANGE UP (ref 0–0.5)
EOSINOPHIL NFR BLD AUTO: 0 % — SIGNIFICANT CHANGE UP (ref 0–6)
EPI CELLS # UR: 8 /HPF — HIGH
FLUAV AG NPH QL: SIGNIFICANT CHANGE UP
FLUBV AG NPH QL: SIGNIFICANT CHANGE UP
GAS PNL BLDV: 133 MMOL/L — LOW (ref 136–145)
GAS PNL BLDV: SIGNIFICANT CHANGE UP
GLUCOSE BLDV-MCNC: 109 MG/DL — HIGH (ref 70–99)
GLUCOSE SERPL-MCNC: 111 MG/DL — HIGH (ref 70–99)
GLUCOSE UR QL: NEGATIVE — SIGNIFICANT CHANGE UP
HCO3 BLDV-SCNC: 23 MMOL/L — SIGNIFICANT CHANGE UP (ref 22–29)
HCT VFR BLD CALC: 36.1 % — SIGNIFICANT CHANGE UP (ref 34.5–45)
HCT VFR BLDA CALC: 36 % — SIGNIFICANT CHANGE UP (ref 34.5–46.5)
HGB BLD CALC-MCNC: 11.9 G/DL — SIGNIFICANT CHANGE UP (ref 11.7–16.1)
HGB BLD-MCNC: 11.8 G/DL — SIGNIFICANT CHANGE UP (ref 11.5–15.5)
HYALINE CASTS # UR AUTO: 2 /LPF — SIGNIFICANT CHANGE UP (ref 0–2)
IMM GRANULOCYTES NFR BLD AUTO: 0.5 % — SIGNIFICANT CHANGE UP (ref 0–0.9)
INR BLD: 1.39 RATIO — HIGH (ref 0.88–1.16)
KETONES UR-MCNC: ABNORMAL
LACTATE BLDV-MCNC: 1 MMOL/L — SIGNIFICANT CHANGE UP (ref 0.5–2)
LEUKOCYTE ESTERASE UR-ACNC: ABNORMAL
LYMPHOCYTES # BLD AUTO: 0.72 K/UL — LOW (ref 1–3.3)
LYMPHOCYTES # BLD AUTO: 5.2 % — LOW (ref 13–44)
MCHC RBC-ENTMCNC: 30.7 PG — SIGNIFICANT CHANGE UP (ref 27–34)
MCHC RBC-ENTMCNC: 32.7 GM/DL — SIGNIFICANT CHANGE UP (ref 32–36)
MCV RBC AUTO: 94 FL — SIGNIFICANT CHANGE UP (ref 80–100)
METHOD TYPE: SIGNIFICANT CHANGE UP
MONOCYTES # BLD AUTO: 1.22 K/UL — HIGH (ref 0–0.9)
MONOCYTES NFR BLD AUTO: 8.8 % — SIGNIFICANT CHANGE UP (ref 2–14)
NEUTROPHILS # BLD AUTO: 11.81 K/UL — HIGH (ref 1.8–7.4)
NEUTROPHILS NFR BLD AUTO: 85.3 % — HIGH (ref 43–77)
NITRITE UR-MCNC: NEGATIVE — SIGNIFICANT CHANGE UP
NRBC # BLD: 0 /100 WBCS — SIGNIFICANT CHANGE UP (ref 0–0)
ORGANISM # SPEC MICROSCOPIC CNT: SIGNIFICANT CHANGE UP
PCO2 BLDV: 38 MMHG — LOW (ref 39–42)
PH BLDV: 7.39 — SIGNIFICANT CHANGE UP (ref 7.32–7.43)
PH UR: 6 — SIGNIFICANT CHANGE UP (ref 5–8)
PLATELET # BLD AUTO: 215 K/UL — SIGNIFICANT CHANGE UP (ref 150–400)
PO2 BLDV: 63 MMHG — HIGH (ref 25–45)
POTASSIUM BLDV-SCNC: 4.3 MMOL/L — SIGNIFICANT CHANGE UP (ref 3.5–5.1)
POTASSIUM SERPL-MCNC: 4.2 MMOL/L — SIGNIFICANT CHANGE UP (ref 3.5–5.3)
POTASSIUM SERPL-SCNC: 4.2 MMOL/L — SIGNIFICANT CHANGE UP (ref 3.5–5.3)
PROT SERPL-MCNC: 6 G/DL — SIGNIFICANT CHANGE UP (ref 6–8.3)
PROT UR-MCNC: ABNORMAL
PROTHROM AB SERPL-ACNC: 16 SEC — HIGH (ref 10.5–13.4)
RBC # BLD: 3.84 M/UL — SIGNIFICANT CHANGE UP (ref 3.8–5.2)
RBC # FLD: 13.2 % — SIGNIFICANT CHANGE UP (ref 10.3–14.5)
RBC CASTS # UR COMP ASSIST: 43 /HPF — HIGH (ref 0–4)
RSV RNA NPH QL NAA+NON-PROBE: SIGNIFICANT CHANGE UP
SAO2 % BLDV: 95.3 % — HIGH (ref 67–88)
SARS-COV-2 RNA SPEC QL NAA+PROBE: SIGNIFICANT CHANGE UP
SODIUM SERPL-SCNC: 135 MMOL/L — SIGNIFICANT CHANGE UP (ref 135–145)
SP GR SPEC: 1.02 — SIGNIFICANT CHANGE UP (ref 1.01–1.02)
SPECIMEN SOURCE: SIGNIFICANT CHANGE UP
UROBILINOGEN FLD QL: NEGATIVE — SIGNIFICANT CHANGE UP
WBC # BLD: 13.85 K/UL — HIGH (ref 3.8–10.5)
WBC # FLD AUTO: 13.85 K/UL — HIGH (ref 3.8–10.5)
WBC UR QL: 12 /HPF — HIGH (ref 0–5)

## 2023-04-11 PROCEDURE — 99223 1ST HOSP IP/OBS HIGH 75: CPT

## 2023-04-11 PROCEDURE — 99222 1ST HOSP IP/OBS MODERATE 55: CPT

## 2023-04-11 PROCEDURE — 99285 EMERGENCY DEPT VISIT HI MDM: CPT

## 2023-04-11 PROCEDURE — 76770 US EXAM ABDO BACK WALL COMP: CPT | Mod: 26

## 2023-04-11 RX ORDER — POLYETHYLENE GLYCOL 3350 17 G/17G
17 POWDER, FOR SOLUTION ORAL DAILY
Refills: 0 | Status: DISCONTINUED | OUTPATIENT
Start: 2023-04-11 | End: 2023-04-14

## 2023-04-11 RX ORDER — SODIUM CHLORIDE 9 MG/ML
1000 INJECTION, SOLUTION INTRAVENOUS
Refills: 0 | Status: DISCONTINUED | OUTPATIENT
Start: 2023-04-11 | End: 2023-04-13

## 2023-04-11 RX ORDER — ERTAPENEM SODIUM 1 G/1
1000 INJECTION, POWDER, LYOPHILIZED, FOR SOLUTION INTRAMUSCULAR; INTRAVENOUS EVERY 24 HOURS
Refills: 0 | Status: DISCONTINUED | OUTPATIENT
Start: 2023-04-12 | End: 2023-04-14

## 2023-04-11 RX ORDER — ONDANSETRON 8 MG/1
4 TABLET, FILM COATED ORAL ONCE
Refills: 0 | Status: COMPLETED | OUTPATIENT
Start: 2023-04-11 | End: 2023-04-11

## 2023-04-11 RX ORDER — ONDANSETRON 8 MG/1
4 TABLET, FILM COATED ORAL EVERY 8 HOURS
Refills: 0 | Status: DISCONTINUED | OUTPATIENT
Start: 2023-04-11 | End: 2023-04-14

## 2023-04-11 RX ORDER — ACETAMINOPHEN 500 MG
1000 TABLET ORAL ONCE
Refills: 0 | Status: DISCONTINUED | OUTPATIENT
Start: 2023-04-11 | End: 2023-04-11

## 2023-04-11 RX ORDER — KETOROLAC TROMETHAMINE 30 MG/ML
15 SYRINGE (ML) INJECTION EVERY 6 HOURS
Refills: 0 | Status: DISCONTINUED | OUTPATIENT
Start: 2023-04-11 | End: 2023-04-14

## 2023-04-11 RX ORDER — PIPERACILLIN AND TAZOBACTAM 4; .5 G/20ML; G/20ML
3.38 INJECTION, POWDER, LYOPHILIZED, FOR SOLUTION INTRAVENOUS ONCE
Refills: 0 | Status: COMPLETED | OUTPATIENT
Start: 2023-04-11 | End: 2023-04-11

## 2023-04-11 RX ORDER — MORPHINE SULFATE 50 MG/1
4 CAPSULE, EXTENDED RELEASE ORAL ONCE
Refills: 0 | Status: DISCONTINUED | OUTPATIENT
Start: 2023-04-11 | End: 2023-04-11

## 2023-04-11 RX ORDER — KETOROLAC TROMETHAMINE 30 MG/ML
15 SYRINGE (ML) INJECTION ONCE
Refills: 0 | Status: DISCONTINUED | OUTPATIENT
Start: 2023-04-11 | End: 2023-04-11

## 2023-04-11 RX ORDER — LANOLIN ALCOHOL/MO/W.PET/CERES
3 CREAM (GRAM) TOPICAL AT BEDTIME
Refills: 0 | Status: DISCONTINUED | OUTPATIENT
Start: 2023-04-11 | End: 2023-04-14

## 2023-04-11 RX ORDER — CEFEPIME 1 G/1
2000 INJECTION, POWDER, FOR SOLUTION INTRAMUSCULAR; INTRAVENOUS ONCE
Refills: 0 | Status: DISCONTINUED | OUTPATIENT
Start: 2023-04-11 | End: 2023-04-11

## 2023-04-11 RX ORDER — ACETAMINOPHEN 500 MG
650 TABLET ORAL EVERY 6 HOURS
Refills: 0 | Status: DISCONTINUED | OUTPATIENT
Start: 2023-04-11 | End: 2023-04-14

## 2023-04-11 RX ORDER — SENNA PLUS 8.6 MG/1
2 TABLET ORAL AT BEDTIME
Refills: 0 | Status: DISCONTINUED | OUTPATIENT
Start: 2023-04-11 | End: 2023-04-14

## 2023-04-11 RX ORDER — ERTAPENEM SODIUM 1 G/1
1000 INJECTION, POWDER, LYOPHILIZED, FOR SOLUTION INTRAMUSCULAR; INTRAVENOUS ONCE
Refills: 0 | Status: COMPLETED | OUTPATIENT
Start: 2023-04-11 | End: 2023-04-11

## 2023-04-11 RX ORDER — ERTAPENEM SODIUM 1 G/1
INJECTION, POWDER, LYOPHILIZED, FOR SOLUTION INTRAMUSCULAR; INTRAVENOUS
Refills: 0 | Status: DISCONTINUED | OUTPATIENT
Start: 2023-04-11 | End: 2023-04-14

## 2023-04-11 RX ORDER — KETOROLAC TROMETHAMINE 30 MG/ML
30 SYRINGE (ML) INJECTION EVERY 6 HOURS
Refills: 0 | Status: DISCONTINUED | OUTPATIENT
Start: 2023-04-11 | End: 2023-04-14

## 2023-04-11 RX ORDER — SODIUM CHLORIDE 9 MG/ML
2000 INJECTION, SOLUTION INTRAVENOUS ONCE
Refills: 0 | Status: COMPLETED | OUTPATIENT
Start: 2023-04-11 | End: 2023-04-11

## 2023-04-11 RX ADMIN — SENNA PLUS 2 TABLET(S): 8.6 TABLET ORAL at 21:38

## 2023-04-11 RX ADMIN — Medication 650 MILLIGRAM(S): at 15:32

## 2023-04-11 RX ADMIN — Medication 15 MILLIGRAM(S): at 08:01

## 2023-04-11 RX ADMIN — Medication 30 MILLIGRAM(S): at 23:07

## 2023-04-11 RX ADMIN — Medication 15 MILLIGRAM(S): at 08:24

## 2023-04-11 RX ADMIN — Medication 15 MILLIGRAM(S): at 13:15

## 2023-04-11 RX ADMIN — ONDANSETRON 4 MILLIGRAM(S): 8 TABLET, FILM COATED ORAL at 03:23

## 2023-04-11 RX ADMIN — Medication 15 MILLIGRAM(S): at 20:07

## 2023-04-11 RX ADMIN — Medication 30 MILLIGRAM(S): at 23:22

## 2023-04-11 RX ADMIN — MORPHINE SULFATE 4 MILLIGRAM(S): 50 CAPSULE, EXTENDED RELEASE ORAL at 03:53

## 2023-04-11 RX ADMIN — PIPERACILLIN AND TAZOBACTAM 200 GRAM(S): 4; .5 INJECTION, POWDER, LYOPHILIZED, FOR SOLUTION INTRAVENOUS at 04:17

## 2023-04-11 RX ADMIN — ERTAPENEM SODIUM 120 MILLIGRAM(S): 1 INJECTION, POWDER, LYOPHILIZED, FOR SOLUTION INTRAMUSCULAR; INTRAVENOUS at 11:35

## 2023-04-11 RX ADMIN — SODIUM CHLORIDE 2000 MILLILITER(S): 9 INJECTION, SOLUTION INTRAVENOUS at 03:22

## 2023-04-11 RX ADMIN — SODIUM CHLORIDE 100 MILLILITER(S): 9 INJECTION, SOLUTION INTRAVENOUS at 13:02

## 2023-04-11 RX ADMIN — POLYETHYLENE GLYCOL 3350 17 GRAM(S): 17 POWDER, FOR SOLUTION ORAL at 13:01

## 2023-04-11 RX ADMIN — Medication 15 MILLIGRAM(S): at 19:48

## 2023-04-11 RX ADMIN — Medication 30 MILLIGRAM(S): at 17:27

## 2023-04-11 RX ADMIN — MORPHINE SULFATE 4 MILLIGRAM(S): 50 CAPSULE, EXTENDED RELEASE ORAL at 03:23

## 2023-04-11 RX ADMIN — MORPHINE SULFATE 4 MILLIGRAM(S): 50 CAPSULE, EXTENDED RELEASE ORAL at 05:28

## 2023-04-11 RX ADMIN — Medication 650 MILLIGRAM(S): at 15:51

## 2023-04-11 RX ADMIN — SODIUM CHLORIDE 100 MILLILITER(S): 9 INJECTION, SOLUTION INTRAVENOUS at 21:40

## 2023-04-11 RX ADMIN — MORPHINE SULFATE 4 MILLIGRAM(S): 50 CAPSULE, EXTENDED RELEASE ORAL at 07:10

## 2023-04-11 RX ADMIN — Medication 15 MILLIGRAM(S): at 13:01

## 2023-04-11 NOTE — PATIENT PROFILE ADULT - FALL HARM RISK - HARM RISK INTERVENTIONS
Communicate Risk of Fall with Harm to all staff/Reinforce activity limits and safety measures with patient and family/Tailored Fall Risk Interventions/Visual Cue: Yellow wristband and red socks/Bed in lowest position, wheels locked, appropriate side rails in place/Call bell, personal items and telephone in reach/Instruct patient to call for assistance before getting out of bed or chair/Non-slip footwear when patient is out of bed/[place] to call system/Physically safe environment - no spills, clutter or unnecessary equipment/Purposeful Proactive Rounding/Room/bathroom lighting operational, light cord in reach Communicate Risk of Fall with Harm to all staff/Reinforce activity limits and safety measures with patient and family/Tailored Fall Risk Interventions/Visual Cue: Yellow wristband and red socks/Bed in lowest position, wheels locked, appropriate side rails in place/Call bell, personal items and telephone in reach/Instruct patient to call for assistance before getting out of bed or chair/Non-slip footwear when patient is out of bed/[place] to call system/Physically safe environment - no spills, clutter or unnecessary equipment/Purposeful Proactive Rounding/Room/bathroom lighting operational, light cord in reach Communicate Risk of Fall with Harm to all staff/Reinforce activity limits and safety measures with patient and family/Tailored Fall Risk Interventions/Visual Cue: Yellow wristband and red socks/Bed in lowest position, wheels locked, appropriate side rails in place/Call bell, personal items and telephone in reach/Instruct patient to call for assistance before getting out of bed or chair/Non-slip footwear when patient is out of bed/[place] to call system/Physically safe environment - no spills, clutter or unnecessary equipment/Purposeful Proactive Rounding/Room/bathroom lighting operational, light cord in reach

## 2023-04-11 NOTE — ED ADULT NURSE NOTE - NSIMPLEMENTINTERV_GEN_ALL_ED
Implemented All Universal Safety Interventions:  [place] to call system. Call bell, personal items and telephone within reach. Instruct patient to call for assistance. Room bathroom lighting operational. Non-slip footwear when patient is off stretcher. Physically safe environment: no spills, clutter or unnecessary equipment. Stretcher in lowest position, wheels locked, appropriate side rails in place. Implemented All Universal Safety Interventions:  [place] to call system. Call bell, personal items and telephone within reach. Instruct patient to call for assistance. Room bathroom lighting operational. Non-slip footwear when patient is off stretcher. Physically safe environment: no spills, clutter or unnecessary equipment. Stretcher in lowest position, wheels locked, appropriate side rails in place. Implemented All Universal Safety Interventions:  [place] to call system. Call bell, personal items and telephone within reach. Instruct patient to call for assistance. Room bathroom lighting operational. Non-slip footwear when patient is off stretcher. Physically safe environment: no spills, clutter or unnecessary equipment. Stretcher in lowest position, wheels locked, appropriate side rails in place.

## 2023-04-11 NOTE — CONSULT NOTE ADULT - ATTENDING COMMENTS
28 y/o F PMHx SLE (receiving IVIG, no other active meds), initially presented on 4/9 with R sided flank/back pain. Found to have R pyelonephritis. Now called back for admission as blood culture growing ESBL e.coli.     Afebrile, HD stable  WBC 14K today  Urine Culture (4/9):  e.coli  Blood Cultures (4/9):  e.coli 1 bottle, CTX-M detected  CT Abd/Pelv (4/9):  acute R pyelonephritis and ureteritis    Impression:  #ESBL E.Coli Bacteremia   #Pyelonephritis  #Fever, Leukocytosis       Recs:  - continue with ertapenem 1g IV q24H  - follow up e.coli susceptibility testing for possible PO option  - repeat blood cultures for clearance  - monitor wbc, leucocytosis trending down.   - check urine GC/chlamydia    Plan discussed with consulting team.       Garett Kidd  Please contact through MS Teams   If no response or past 5 pm/weekend call [phone]. 28 y/o F PMHx SLE (receiving IVIG, no other active meds), initially presented on 4/9 with R sided flank/back pain. Found to have R pyelonephritis. Now called back for admission as blood culture growing ESBL e.coli.     Afebrile, HD stable  WBC 14K today  Urine Culture (4/9):  e.coli  Blood Cultures (4/9):  e.coli 1 bottle, CTX-M detected  CT Abd/Pelv (4/9):  acute R pyelonephritis and ureteritis    Impression:  #ESBL E.Coli Bacteremia   #Pyelonephritis  #Fever, Leukocytosis       Recs:  - continue with ertapenem 1g IV q24H  - follow up e.coli susceptibility testing for possible PO option  - repeat blood cultures for clearance  - monitor wbc, leucocytosis trending down.   - check urine GC/chlamydia    Plan discussed with consulting team.       Garett Kidd  Please contact through MS Teams   If no response or past 5 pm/weekend call [phone]. 26 y/o F PMHx SLE (receiving IVIG, no other active meds), initially presented on 4/9 with R sided flank/back pain. Found to have R pyelonephritis. Now called back for admission as blood culture growing ESBL e.coli.     Afebrile, HD stable  WBC 14K today  Urine Culture (4/9):  e.coli  Blood Cultures (4/9):  e.coli 1 bottle, CTX-M detected  CT Abd/Pelv (4/9):  acute R pyelonephritis and ureteritis    Impression:  #ESBL E.Coli Bacteremia   #Pyelonephritis  #Fever, Leukocytosis       Recs:  - continue with ertapenem 1g IV q24H  - follow up e.coli susceptibility testing for possible PO option  - repeat blood cultures for clearance  - monitor wbc, leucocytosis trending down.   - check urine GC/chlamydia    Plan discussed with consulting team.       Garett Kidd  Please contact through MS Teams   If no response or past 5 pm/weekend call [phone].

## 2023-04-11 NOTE — ED ADULT NURSE NOTE - OBJECTIVE STATEMENT
28yo F pmh SLE, presents to ED from home with  present at the bedside, sent in for positive blood cultures. pt was discharged yesterday with diagnosis of pyelo after coming to the ED for onset of R flank pain. reports pain is now worsening and felt in b/l flanks radiating to the ribs. pt also endorses vomiting tonight since being discharged. pt discharged on Vimpat and has been using Tylenol for pain control with no relief. pt currently endorsing 9/10 pain. pt denies any urinary symptoms. on exam pt is generally well-appearing in NAD but appears uncomfortable in pain. pt is a&ox4, ambulatory independently, breathing even and unlabored, vital signs stable, afebrile,

## 2023-04-11 NOTE — CONSULT NOTE ADULT - ASSESSMENT
26 y/o F PMHx SLE (receiving IVIG, no other active meds), initially presented on 4/9 with R sided flank/back pain. Found to have R pyelonephritis. Now called back for admission as blood culture growing ESBL e.coli.     Afebrile, HD stable  WBC 14K today  Urine Culture (4/9):  e.coli  Blood Cultures (4/9):  e.coli 1 bottle, CTX-M detected  CT Abd/Pelv (4/9):  acute R pyelonephritis and ureteritis    Impression:  #ESBL E.Coli Bacteremia   #Pyelonephritis  #Fever, Leukocytosis     Recs:  - continue with ertapenem 1g IV q24H  - follow up e.coli susceptibility testing for possible PO option  - repeat blood cultures for clearance  - monitor temp, WBCs      Slade Martinez MD  Infectious Disease Fellow  Available on [employer] Teams  Before 9AM or after 5PM: [phone]  28 y/o F PMHx SLE (receiving IVIG, no other active meds), initially presented on 4/9 with R sided flank/back pain. Found to have R pyelonephritis. Now called back for admission as blood culture growing ESBL e.coli.     Afebrile, HD stable  WBC 14K today  Urine Culture (4/9):  e.coli  Blood Cultures (4/9):  e.coli 1 bottle, CTX-M detected  CT Abd/Pelv (4/9):  acute R pyelonephritis and ureteritis    Impression:  #ESBL E.Coli Bacteremia   #Pyelonephritis  #Fever, Leukocytosis     Recs:  - continue with ertapenem 1g IV q24H  - follow up e.coli susceptibility testing for possible PO option  - repeat blood cultures for clearance  - monitor temp, WBCs      Slade Martinez MD  Infectious Disease Fellow  Available on [employer] Teams  Before 9AM or after 5PM: [phone]  26 y/o F PMHx SLE (receiving IVIG, no other active meds), initially presented on 4/9 with R sided flank/back pain. Found to have R pyelonephritis. Now called back for admission as blood culture growing ESBL e.coli.     Afebrile, HD stable  WBC 14K today  Urine Culture (4/9):  e.coli  Blood Cultures (4/9):  e.coli 1 bottle, CTX-M detected  CT Abd/Pelv (4/9):  acute R pyelonephritis and ureteritis    Impression:  #ESBL E.Coli Bacteremia   #Pyelonephritis  #Fever, Leukocytosis     Recs:  - continue with ertapenem 1g IV q24H  - follow up e.coli susceptibility testing for possible PO option  - repeat blood cultures for clearance  - monitor temp, WBCs      Slade Martinez MD  Infectious Disease Fellow  Available on [employer] Teams  Before 9AM or after 5PM: [phone]  26 y/o F PMHx SLE (receiving IVIG, no other active meds), initially presented on 4/9 with R sided flank/back pain. Found to have R pyelonephritis. Now called back for admission as blood culture growing ESBL e.coli.     Afebrile, HD stable  WBC 14K today  Urine Culture (4/9):  e.coli  Blood Cultures (4/9):  e.coli 1 bottle, CTX-M detected  CT Abd/Pelv (4/9):  acute R pyelonephritis and ureteritis    Impression:  #ESBL E.Coli Bacteremia   #Pyelonephritis  #Fever, Leukocytosis     Recs:  - continue with ertapenem 1g IV q24H  - follow up e.coli susceptibility testing for possible PO option  - repeat blood cultures for clearance  - monitor temp, WBCs  - check urine GC/chlamydia      Slade Martinez MD  Infectious Disease Fellow  Available on [employer] Teams  Before 9AM or after 5PM: [phone]  26 y/o F PMHx SLE (receiving IVIG, no other active meds), initially presented on 4/9 with R sided flank/back pain. Found to have R pyelonephritis. Now called back for admission as blood culture growing ESBL e.coli.     Afebrile, HD stable  WBC 14K today  Urine Culture (4/9):  e.coli  Blood Cultures (4/9):  e.coli 1 bottle, CTX-M detected  CT Abd/Pelv (4/9):  acute R pyelonephritis and ureteritis    Impression:  #ESBL E.Coli Bacteremia   #Pyelonephritis  #Fever, Leukocytosis     Recs:  - continue with ertapenem 1g IV q24H  - follow up e.coli susceptibility testing for possible PO option  - repeat blood cultures for clearance  - monitor temp, WBCs  - check urine GC/chlamydia      Slade Martinez MD  Infectious Disease Fellow  Available on [employer] Teams  Before 9AM or after 5PM: [phone]  26 y/o F PMHx SLE (receiving IVIG, no other active meds), initially presented on 4/9 with R sided flank/back pain. Found to have R pyelonephritis. Now called back for admission as blood culture growing ESBL e.coli.     Afebrile, HD stable  WBC 14K today  Urine Culture (4/9):  e.coli  Blood Cultures (4/9):  e.coli 1 bottle, CTX-M detected  CT Abd/Pelv (4/9):  acute R pyelonephritis and ureteritis    Impression:  #ESBL E.Coli Bacteremia   #Pyelonephritis  #Fever, Leukocytosis     Recs:  - continue with ertapenem 1g IV q24H  - follow up e.coli susceptibility testing for possible PO option  - repeat blood cultures for clearance  - monitor temp, WBCs  - check urine GC/chlamydia      Slade Martinez MD  Infectious Disease Fellow  Available on [employer] Teams  Before 9AM or after 5PM: [phone]

## 2023-04-11 NOTE — H&P ADULT - NSHPPHYSICALEXAM_GEN_ALL_CORE
Vital Signs Last 24 Hrs  T(C): 37.1 (11 Apr 2023 06:23), Max: 37.6 (11 Apr 2023 02:13)  T(F): 98.8 (11 Apr 2023 06:23), Max: 99.7 (11 Apr 2023 02:13)  HR: 97 (11 Apr 2023 06:23) (72 - 97)  BP: 103/60 (11 Apr 2023 06:23) (100/61 - 117/64)  BP(mean): 71 (11 Apr 2023 03:35) (71 - 76)  RR: 16 (11 Apr 2023 06:23) (16 - 20)  SpO2: 97% (11 Apr 2023 06:23) (97% - 99%)    Parameters below as of 11 Apr 2023 06:23  Patient On (Oxygen Delivery Method): room air        CONSTITUTIONAL: Well-groomed, in no apparent distress  EYES: No conjunctival or scleral injection, non-icteric; PERRLA and symmetric  ENMT: No external nasal lesions; nasal mucosa not inflamed; normal dentition; no pharyngeal injection or exudates, oral mucosa with moist membranes  NECK: Trachea midline without palpable neck mass; thyroid not enlarged and non-tender  RESPIRATORY: Breathing comfortably; no dullness to percussion; lungs CTA without wheeze/rhonchi/rales  CARDIOVASCULAR: +S1S2, RRR, no M/G/R; no carotid bruits; pedal pulses full and symmetric; no lower extremity edema  GASTROINTESTINAL: No palpable masses, mild tenderness to palpation diffusely, +BS throughout, no rebound/guarding; no hepatosplenomegaly; no hernia palpated  MUSCULOSKELETAL: no digital clubbing or cyanosis; no paraspinal tenderness; examination of the  (head/neck, spine/ribs/pelvis, RUE, LUE, RLE, LLE) without misalignment, normal strength and tone of extremities, + b/l CVA tenderness  SKIN: No rashes or ulcers noted; no subcutaneous nodules or induration palpable  NEUROLOGIC: CN II-XII intact; sensation intact in LEs b/l to light touch  PSYCHIATRIC: A+O x 3; mood and affect appropriate; appropriate insight and judgment

## 2023-04-11 NOTE — H&P ADULT - PROBLEM SELECTOR PLAN 3
-Not on any medications, received IVIG infusions w/ [facility] and has a Rheumatologist, last infusion was 3 months ago

## 2023-04-11 NOTE — ED PROVIDER NOTE - OBJECTIVE STATEMENT
"CC: Here for routine prenatal visit @ 37w2d   HPI: + FM, no ctx, no LOF, no VB.  No complaints.     PE: /83  Pulse 85  Temp 97.5  F (36.4  C)  Resp 16  Ht 5' 7\" (1.702 m)  Wt 171 lb 12.8 oz (77.9 kg)  LMP 2018 (Approximate)  BMI 26.91 kg/m2   See OB flowsheet    A/P  @ 37w2d normal pregnancy    1. Routine prenatal care  2. S<D: ultrasound ordered    RTC 1 week    Samira Solis M.D.      Addendum: U/S 39%ile  "
27-year-old female chief complaint positive blood cultures and recently discharged with pyelo-.  Patient has history of lupus..  Patient notes that her symptoms are getting progressively worse since being discharged was on Zosyn and was being given sent home on Vimpat.  Patient called because her blood cultures grew gram-negative rods.  Patient endorsing abdominal pain nausea and vomiting.  Patient took Tylenol for pain control

## 2023-04-11 NOTE — ED POST DISCHARGE NOTE - DETAILS
04/11/2023 approx 00:45 notified by [employer] of abnormal blood culture in 1 bottle, Growth in aerobic bottle: Gram Negative Rods. Unable to reach patient, left VM and discussed w/ Mother to call back urgently. Pt needs to return for further eval and treatment for sepsis. c/d/w Dr Haddad. Hasmukh SIMONS 04/11/2023 approx 00:45 notified by [employer] of abnormal blood culture in 1 bottle, Growth in aerobic bottle: Gram Negative Rods. Unable to reach patient, left VM and discussed w/ Mother to call back urgently. Pt needs to return for further eval and treatment for sepsis. c/d/w Dr Haddad. Hasmukh SIMONS 04/11/2023 approx 00:45 notified by [facility] of abnormal blood culture in 1 bottle, Growth in aerobic bottle: Gram Negative Rods. Unable to reach patient, left VM and discussed w/ Mother to call back urgently. Pt needs to return for further eval and treatment for sepsis. c/d/w Dr Haddad. Hasmukh SIMONS

## 2023-04-11 NOTE — ED PROVIDER NOTE - CARE PLAN
1 Principal Discharge DX:	Pyelonephritis   Principal Discharge DX:	Pyelonephritis  Secondary Diagnosis:	Positive blood culture

## 2023-04-11 NOTE — H&P ADULT - HISTORY OF PRESENT ILLNESS
27F w/ PMHx SLE presenting for back pain, pyelonephritis and positive blood cultures. Patient was seen in the ED yesterday when she was diagnosed with Pyelo and placed in CDU, discharged on cefpodoxime. Blood cultures came back positive so she was told to come back to be admitted to the ED. Patient reports that her symptoms began on 4/8 with R sided back pain and fevers that brought her to the hospital at that time. Since discharge she has noticed L sided back pain and describes the sensation that her "ribs are being stabbed". She endorses vomiting and overall feeling worse. No changes in her urine.     In the ED she was given morphine, toradol, zofran, and a dose of zosyn. Blood cultures from last visit showed ESBL E coli.

## 2023-04-11 NOTE — H&P ADULT - PROBLEM SELECTOR PLAN 2
-see above   -pain control with toradol for now, would avoid opioids if possible given patient has been constipated for over a week, bowel regimen ordered

## 2023-04-11 NOTE — H&P ADULT - NSHPREVIEWOFSYSTEMS_GEN_ALL_CORE
CONSTITUTIONAL: + fever, no weight loss  EYES: No eye pain, visual disturbances, or discharge  ENMT:  No difficulty hearing, tinnitus, vertigo; No sinus or throat pain  RESPIRATORY: No SOB. No cough, wheezing, chills or hemoptysis  CARDIOVASCULAR: No chest pain, palpitations, dizziness, or leg swelling  GASTROINTESTINAL: No abdominal or epigastric pain. + nausea, vomiting, no hematemesis; No diarrhea, + constipation.   GENITOURINARY: No dysuria, frequency, hematuria, or incontinence  NEUROLOGICAL: No headaches, memory loss, loss of strength, numbness, or tremors  SKIN: No itching, burning, rashes, or lesions   LYMPH NODES: No enlarged glands  ENDOCRINE: No heat or cold intolerance; No hair loss  MUSCULOSKELETAL: No joint pain or swelling; No muscle, back pain  PSYCHIATRIC: No depression, anxiety, mood swings, or difficulty sleeping  HEME/LYMPH: No easy bruising, or bleeding gums

## 2023-04-11 NOTE — H&P ADULT - NSICDXFAMILYHX_GEN_ALL_CORE_FT
FAMILY HISTORY:  Grandparent  Still living? Unknown  FH: lupus erythematosus, Age at diagnosis: Age Unknown

## 2023-04-11 NOTE — H&P ADULT - PROBLEM SELECTOR PLAN 4
Diet: Regular  DVT: low risk, encourage ambulation  Dispo: pending improvement in clinical condition

## 2023-04-11 NOTE — CONSULT NOTE ADULT - SUBJECTIVE AND OBJECTIVE BOX
Patient is a 27y old  Female who presents with a chief complaint of bacteremia 2/2 pyelonephritis (2023 12:53)    HPI:  27F w/ PMHx SLE presenting for back pain, pyelonephritis and positive blood cultures. Patient was seen in the ED yesterday when she was diagnosed with Pyelo and placed in CDU, discharged on cefpodoxime. Blood cultures came back positive so she was told to come back to be admitted to the ED. Patient reports that her symptoms began on  with R sided back pain and fevers that brought her to the hospital at that time. Since discharge she has noticed L sided back pain and describes the sensation that her "ribs are being stabbed". She endorses vomiting and overall feeling worse. No changes in her urine.     In the ED she was given morphine, toradol, zofran, and a dose of zosyn. Blood cultures from last visit showed ESBL E coli.  (2023 12:53)     ID consulted for ESBL ecoli bacteremia. Endorsing R sided flank/back pain, no dysuria, no fever/chills currently    REVIEW OF SYSTEMS  [  ] ROS unobtainable because:    [ x ] All other systems negative except as noted below    Constitutional:  [ ] fever [ ] chills  [ ] weight loss  [ ]night sweat  [ ]poor appetite/PO intake [ ]fatigue   Skin:  [ ] rash [ ] phlebitis	  Eyes: [ ] icterus [ ] pain  [ ] discharge	  ENMT: [ ] sore throat  [ ] thrush [ ] ulcers [ ] exudates [ ]anosmia  Respiratory: [ ] dyspnea [ ] hemoptysis [ ] cough [ ] sputum	  Cardiovascular:  [ ] chest pain [ ] palpitations [ ] edema	  Gastrointestinal:  [ ] nausea [ ] vomiting [ ] diarrhea [ ] constipation [ ] pain	  Genitourinary:  [ ] dysuria [ ] frequency [ ] hematuria [ ] discharge [ ] flank pain  [ ] incontinence  Musculoskeletal:  [ ] myalgias [ ] arthralgias [ ] arthritis  [ ] back pain  Neurological:  [ ] headache [ ] weakness [ ] seizures  [ ] confusion/altered mental status    prior hospital charts reviewed [V]  primary team notes reviewed [V]  other consultant notes reviewed [V]    PAST MEDICAL & SURGICAL HISTORY:  Lupus    H/O breast augmentation    S/P tonsillectomy      FAMILY HISTORY:  FH: lupus erythematosus (Grandparent)    SOCIAL HISTORY:  Denied smoking    Allergies  hickory (Anaphylaxis)  Kiwi (Angioedema)  No Known Drug Allergies  strawberry (Angioedema)    ANTIMICROBIALS:  ertapenem  IVPB      ANTIMICROBIALS (past 90 days):   MEDICATIONS  (STANDING):    ertapenem  IVPB   120 mL/Hr IV Intermittent (23 @ 11:35)    piperacillin/tazobactam IVPB...   200 mL/Hr IV Intermittent (23 @ 04:17)    MEDICATIONS  (STANDING):  acetaminophen     Tablet .. 650 every 6 hours PRN  aluminum hydroxide/magnesium hydroxide/simethicone Suspension 30 every 4 hours PRN  ketorolac   Injectable 30 every 6 hours PRN  ketorolac   Injectable 15 every 6 hours PRN  melatonin 3 at bedtime PRN  ondansetron Injectable 4 every 8 hours PRN  polyethylene glycol 3350 17 daily  senna 2 at bedtime    VITALS:  Vital Signs Last 24 Hrs  T(F): 98 (23 @ 13:02), Max: 100.5 (23 @ 15:57)  Vital Signs Last 24 Hrs  HR: 61 (23 @ 13:02) (61 - 97)  BP: 106/67 (23 @ 13:02) (100/61 - 117/64)  RR: 18 (23 @ 13:02)  SpO2: 97% (23 @ 13:02) (97% - 99%)  Wt(kg): --    PHYSICAL EXAM:  Constitutional: non-toxic, no distress  HEAD/EYES: anicteric, no conjunctival injection  ENT:  supple, no thrush  Cardiac:   +S1/S2  Respiratory:  +BS bilaterally  GI:  soft, non-tender, +bowel sounds  :  no lynn, +R CVA tenderness  Musculoskeletal:  no synovitis, normal ROM  Neurologic: awake and alert,  no focal findings  Skin:  no rash, no erythema, no phlebitis  Vascular: warm extremities b/l  Psychiatric:  calm, cooperative    Labs:                        11.8   13.85 )-----------( 215      ( 2023 03:40 )             36.1         135  |  102  |  9   ----------------------------<  111<H>  4.2   |  23  |  0.69    Ca    8.6      2023 03:40    TPro  6.0  /  Alb  3.6  /  TBili  0.5  /  DBili  x   /  AST  26  /  ALT  19  /  AlkPhos  71  04-11    WBC Trend:  WBC Count: 13.85 (23 @ 03:40)  WBC Count: 14.24 (04-10-23 @ 15:48)  WBC Count: 18.08 (04-10-23 @ 09:01)  WBC Count: 17.54 (23 @ 16:59)    Auto Neutrophil #: 11.81 K/uL (23 @ 03:40)  Auto Neutrophil #: 11.67 K/uL (04-10-23 @ 15:48)  Auto Neutrophil #: 15.97 K/uL (04-10-23 @ 09:01)  Auto Neutrophil #: 15.64 K/uL (23 @ 16:59)    Auto Eosinophil %: 0.0 % (23 @ 03:40)  Auto Eosinophil %: 0.6 % (04-10-23 @ 15:48)  Auto Eosinophil %: 0.1 % (04-10-23 @ 09:01)  Auto Eosinophil %: 0.2 % (23 @ 16:59)    Urinalysis Basic - ( 2023 04:06 )    Color: Yellow / Appearance: Clear / S.016 / pH: x  Gluc: x / Ketone: Small  / Bili: Negative / Urobili: Negative   Blood: x / Protein: Trace / Nitrite: Negative   Leuk Esterase: Small / RBC: 43 /hpf / WBC 12 /HPF   Sq Epi: x / Non Sq Epi: x / Bacteria: Few    MICROBIOLOGY:  Culture - Blood (collected 2023 17:15)  Source: .Blood Blood-Peripheral  Preliminary Report:    No growth to date.    Culture - Urine (collected 2023 17:00)  Source: Clean Catch Clean Catch (Midstream)  Preliminary Report:    >100,000 CFU/ml Escherichia coli    Culture - Blood (collected 2023 17:00)  Source: .Blood Blood-Peripheral  Preliminary Report:    Growth in aerobic bottle: Escherichia coli    ***Blood Panel PCR results on this specimen are available    approximately 3 hours after the Gram stain result.***    Gram stain, PCR, and/or culture results may not always    correspond due to difference in methodologies.    ************************************************************    This PCR assay was performed by multiplex PCR. This    Assay tests for 66 bacterial and resistance gene targets.    Please refer to the [facility] test directory    at [email or web address] for details.  Organism: Blood Culture PCR  Organism: Blood Culture PCR    Sensitivities:      Method Type: PCR      -  Escherichia coli: Detec      -  ESBL: Detec      -  CTX-M Resistance Marker: Detec    Rapid RVP Result: NotDetec ( @ 17:32)    RADIOLOGY:  imaging below personally reviewed    < from: CT Abdomen and Pelvis w/ Oral Cont and w/ IV Cont (23 @ 19:13) >  IMPRESSION:  Acute right pyelonephritis and ureteritis.  < end of copied text >   Patient is a 27y old  Female who presents with a chief complaint of bacteremia 2/2 pyelonephritis (2023 12:53)    HPI:  27F w/ PMHx SLE presenting for back pain, pyelonephritis and positive blood cultures. Patient was seen in the ED yesterday when she was diagnosed with Pyelo and placed in CDU, discharged on cefpodoxime. Blood cultures came back positive so she was told to come back to be admitted to the ED. Patient reports that her symptoms began on  with R sided back pain and fevers that brought her to the hospital at that time. Since discharge she has noticed L sided back pain and describes the sensation that her "ribs are being stabbed". She endorses vomiting and overall feeling worse. No changes in her urine.     In the ED she was given morphine, toradol, zofran, and a dose of zosyn. Blood cultures from last visit showed ESBL E coli.  (2023 12:53)     ID consulted for ESBL ecoli bacteremia. Endorsing R sided flank/back pain, no dysuria, no fever/chills currently    REVIEW OF SYSTEMS  [  ] ROS unobtainable because:    [ x ] All other systems negative except as noted below    Constitutional:  [ ] fever [ ] chills  [ ] weight loss  [ ]night sweat  [ ]poor appetite/PO intake [ ]fatigue   Skin:  [ ] rash [ ] phlebitis	  Eyes: [ ] icterus [ ] pain  [ ] discharge	  ENMT: [ ] sore throat  [ ] thrush [ ] ulcers [ ] exudates [ ]anosmia  Respiratory: [ ] dyspnea [ ] hemoptysis [ ] cough [ ] sputum	  Cardiovascular:  [ ] chest pain [ ] palpitations [ ] edema	  Gastrointestinal:  [ ] nausea [ ] vomiting [ ] diarrhea [ ] constipation [ ] pain	  Genitourinary:  [ ] dysuria [ ] frequency [ ] hematuria [ ] discharge [ ] flank pain  [ ] incontinence  Musculoskeletal:  [ ] myalgias [ ] arthralgias [ ] arthritis  [ ] back pain  Neurological:  [ ] headache [ ] weakness [ ] seizures  [ ] confusion/altered mental status    prior hospital charts reviewed [V]  primary team notes reviewed [V]  other consultant notes reviewed [V]    PAST MEDICAL & SURGICAL HISTORY:  Lupus    H/O breast augmentation    S/P tonsillectomy      FAMILY HISTORY:  FH: lupus erythematosus (Grandparent)    SOCIAL HISTORY:  Denied smoking    Allergies  hickory (Anaphylaxis)  Kiwi (Angioedema)  No Known Drug Allergies  strawberry (Angioedema)    ANTIMICROBIALS:  ertapenem  IVPB      ANTIMICROBIALS (past 90 days):   MEDICATIONS  (STANDING):    ertapenem  IVPB   120 mL/Hr IV Intermittent (23 @ 11:35)    piperacillin/tazobactam IVPB...   200 mL/Hr IV Intermittent (23 @ 04:17)    MEDICATIONS  (STANDING):  acetaminophen     Tablet .. 650 every 6 hours PRN  aluminum hydroxide/magnesium hydroxide/simethicone Suspension 30 every 4 hours PRN  ketorolac   Injectable 30 every 6 hours PRN  ketorolac   Injectable 15 every 6 hours PRN  melatonin 3 at bedtime PRN  ondansetron Injectable 4 every 8 hours PRN  polyethylene glycol 3350 17 daily  senna 2 at bedtime    VITALS:  Vital Signs Last 24 Hrs  T(F): 98 (23 @ 13:02), Max: 100.5 (23 @ 15:57)  Vital Signs Last 24 Hrs  HR: 61 (23 @ 13:02) (61 - 97)  BP: 106/67 (23 @ 13:02) (100/61 - 117/64)  RR: 18 (23 @ 13:02)  SpO2: 97% (23 @ 13:02) (97% - 99%)  Wt(kg): --    PHYSICAL EXAM:  Constitutional: non-toxic, no distress  HEAD/EYES: anicteric, no conjunctival injection  ENT:  supple, no thrush  Cardiac:   +S1/S2  Respiratory:  +BS bilaterally  GI:  soft, non-tender, +bowel sounds  :  no lynn, +R CVA tenderness  Musculoskeletal:  no synovitis, normal ROM  Neurologic: awake and alert,  no focal findings  Skin:  no rash, no erythema, no phlebitis  Vascular: warm extremities b/l  Psychiatric:  calm, cooperative    Labs:                        11.8   13.85 )-----------( 215      ( 2023 03:40 )             36.1         135  |  102  |  9   ----------------------------<  111<H>  4.2   |  23  |  0.69    Ca    8.6      2023 03:40    TPro  6.0  /  Alb  3.6  /  TBili  0.5  /  DBili  x   /  AST  26  /  ALT  19  /  AlkPhos  71  04-11    WBC Trend:  WBC Count: 13.85 (23 @ 03:40)  WBC Count: 14.24 (04-10-23 @ 15:48)  WBC Count: 18.08 (04-10-23 @ 09:01)  WBC Count: 17.54 (23 @ 16:59)    Auto Neutrophil #: 11.81 K/uL (23 @ 03:40)  Auto Neutrophil #: 11.67 K/uL (04-10-23 @ 15:48)  Auto Neutrophil #: 15.97 K/uL (04-10-23 @ 09:01)  Auto Neutrophil #: 15.64 K/uL (23 @ 16:59)    Auto Eosinophil %: 0.0 % (23 @ 03:40)  Auto Eosinophil %: 0.6 % (04-10-23 @ 15:48)  Auto Eosinophil %: 0.1 % (04-10-23 @ 09:01)  Auto Eosinophil %: 0.2 % (23 @ 16:59)    Urinalysis Basic - ( 2023 04:06 )    Color: Yellow / Appearance: Clear / S.016 / pH: x  Gluc: x / Ketone: Small  / Bili: Negative / Urobili: Negative   Blood: x / Protein: Trace / Nitrite: Negative   Leuk Esterase: Small / RBC: 43 /hpf / WBC 12 /HPF   Sq Epi: x / Non Sq Epi: x / Bacteria: Few    MICROBIOLOGY:  Culture - Blood (collected 2023 17:15)  Source: .Blood Blood-Peripheral  Preliminary Report:    No growth to date.    Culture - Urine (collected 2023 17:00)  Source: Clean Catch Clean Catch (Midstream)  Preliminary Report:    >100,000 CFU/ml Escherichia coli    Culture - Blood (collected 2023 17:00)  Source: .Blood Blood-Peripheral  Preliminary Report:    Growth in aerobic bottle: Escherichia coli    ***Blood Panel PCR results on this specimen are available    approximately 3 hours after the Gram stain result.***    Gram stain, PCR, and/or culture results may not always    correspond due to difference in methodologies.    ************************************************************    This PCR assay was performed by multiplex PCR. This    Assay tests for 66 bacterial and resistance gene targets.    Please refer to the [facility] test directory    at [email or web address] for details.  Organism: Blood Culture PCR  Organism: Blood Culture PCR    Sensitivities:      Method Type: PCR      -  Escherichia coli: Detec      -  ESBL: Detec      -  CTX-M Resistance Marker: Detec    Rapid RVP Result: NotDetec ( @ 17:32)    RADIOLOGY:  imaging below personally reviewed    < from: CT Abdomen and Pelvis w/ Oral Cont and w/ IV Cont (23 @ 19:13) >  IMPRESSION:  Acute right pyelonephritis and ureteritis.  < end of copied text >   Patient is a 27y old  Female who presents with a chief complaint of bacteremia 2/2 pyelonephritis (2023 12:53)    HPI:  27F w/ PMHx SLE presenting for back pain, pyelonephritis and positive blood cultures. Patient was seen in the ED yesterday when she was diagnosed with Pyelo and placed in CDU, discharged on cefpodoxime. Blood cultures came back positive so she was told to come back to be admitted to the ED. Patient reports that her symptoms began on  with R sided back pain and fevers that brought her to the hospital at that time. Since discharge she has noticed L sided back pain and describes the sensation that her "ribs are being stabbed". She endorses vomiting and overall feeling worse. No changes in her urine.     In the ED she was given morphine, toradol, zofran, and a dose of zosyn. Blood cultures from last visit showed ESBL E coli.  (2023 12:53)     ID consulted for ESBL ecoli bacteremia. Endorsing R sided flank/back pain, no dysuria, no fever/chills currently    REVIEW OF SYSTEMS  [  ] ROS unobtainable because:    [ x ] All other systems negative except as noted below    Constitutional:  [ ] fever [ ] chills  [ ] weight loss  [ ]night sweat  [ ]poor appetite/PO intake [ ]fatigue   Skin:  [ ] rash [ ] phlebitis	  Eyes: [ ] icterus [ ] pain  [ ] discharge	  ENMT: [ ] sore throat  [ ] thrush [ ] ulcers [ ] exudates [ ]anosmia  Respiratory: [ ] dyspnea [ ] hemoptysis [ ] cough [ ] sputum	  Cardiovascular:  [ ] chest pain [ ] palpitations [ ] edema	  Gastrointestinal:  [ ] nausea [ ] vomiting [ ] diarrhea [ ] constipation [ ] pain	  Genitourinary:  [ ] dysuria [ ] frequency [ ] hematuria [ ] discharge [ ] flank pain  [ ] incontinence  Musculoskeletal:  [ ] myalgias [ ] arthralgias [ ] arthritis  [ ] back pain  Neurological:  [ ] headache [ ] weakness [ ] seizures  [ ] confusion/altered mental status    prior hospital charts reviewed [V]  primary team notes reviewed [V]  other consultant notes reviewed [V]    PAST MEDICAL & SURGICAL HISTORY:  Lupus    H/O breast augmentation    S/P tonsillectomy      FAMILY HISTORY:  FH: lupus erythematosus (Grandparent)    SOCIAL HISTORY:  Denied smoking    Allergies  hickory (Anaphylaxis)  Kiwi (Angioedema)  No Known Drug Allergies  strawberry (Angioedema)    ANTIMICROBIALS:  ertapenem  IVPB      ANTIMICROBIALS (past 90 days):   MEDICATIONS  (STANDING):    ertapenem  IVPB   120 mL/Hr IV Intermittent (23 @ 11:35)    piperacillin/tazobactam IVPB...   200 mL/Hr IV Intermittent (23 @ 04:17)    MEDICATIONS  (STANDING):  acetaminophen     Tablet .. 650 every 6 hours PRN  aluminum hydroxide/magnesium hydroxide/simethicone Suspension 30 every 4 hours PRN  ketorolac   Injectable 30 every 6 hours PRN  ketorolac   Injectable 15 every 6 hours PRN  melatonin 3 at bedtime PRN  ondansetron Injectable 4 every 8 hours PRN  polyethylene glycol 3350 17 daily  senna 2 at bedtime    VITALS:  Vital Signs Last 24 Hrs  T(F): 98 (23 @ 13:02), Max: 100.5 (23 @ 15:57)  Vital Signs Last 24 Hrs  HR: 61 (23 @ 13:02) (61 - 97)  BP: 106/67 (23 @ 13:02) (100/61 - 117/64)  RR: 18 (23 @ 13:02)  SpO2: 97% (23 @ 13:02) (97% - 99%)  Wt(kg): --    PHYSICAL EXAM:  Constitutional: non-toxic, no distress  HEAD/EYES: anicteric, no conjunctival injection  ENT:  supple, no thrush  Cardiac:   +S1/S2  Respiratory:  +BS bilaterally  GI:  soft, non-tender, +bowel sounds  :  no lynn, +R CVA tenderness  Musculoskeletal:  no synovitis, normal ROM  Neurologic: awake and alert,  no focal findings  Skin:  no rash, no erythema, no phlebitis  Vascular: warm extremities b/l  Psychiatric:  calm, cooperative    Labs:                        11.8   13.85 )-----------( 215      ( 2023 03:40 )             36.1         135  |  102  |  9   ----------------------------<  111<H>  4.2   |  23  |  0.69    Ca    8.6      2023 03:40    TPro  6.0  /  Alb  3.6  /  TBili  0.5  /  DBili  x   /  AST  26  /  ALT  19  /  AlkPhos  71  04-11    WBC Trend:  WBC Count: 13.85 (23 @ 03:40)  WBC Count: 14.24 (04-10-23 @ 15:48)  WBC Count: 18.08 (04-10-23 @ 09:01)  WBC Count: 17.54 (23 @ 16:59)    Auto Neutrophil #: 11.81 K/uL (23 @ 03:40)  Auto Neutrophil #: 11.67 K/uL (04-10-23 @ 15:48)  Auto Neutrophil #: 15.97 K/uL (04-10-23 @ 09:01)  Auto Neutrophil #: 15.64 K/uL (23 @ 16:59)    Auto Eosinophil %: 0.0 % (23 @ 03:40)  Auto Eosinophil %: 0.6 % (04-10-23 @ 15:48)  Auto Eosinophil %: 0.1 % (04-10-23 @ 09:01)  Auto Eosinophil %: 0.2 % (23 @ 16:59)    Urinalysis Basic - ( 2023 04:06 )    Color: Yellow / Appearance: Clear / S.016 / pH: x  Gluc: x / Ketone: Small  / Bili: Negative / Urobili: Negative   Blood: x / Protein: Trace / Nitrite: Negative   Leuk Esterase: Small / RBC: 43 /hpf / WBC 12 /HPF   Sq Epi: x / Non Sq Epi: x / Bacteria: Few    MICROBIOLOGY:  Culture - Blood (collected 2023 17:15)  Source: .Blood Blood-Peripheral  Preliminary Report:    No growth to date.    Culture - Urine (collected 2023 17:00)  Source: Clean Catch Clean Catch (Midstream)  Preliminary Report:    >100,000 CFU/ml Escherichia coli    Culture - Blood (collected 2023 17:00)  Source: .Blood Blood-Peripheral  Preliminary Report:    Growth in aerobic bottle: Escherichia coli    ***Blood Panel PCR results on this specimen are available    approximately 3 hours after the Gram stain result.***    Gram stain, PCR, and/or culture results may not always    correspond due to difference in methodologies.    ************************************************************    This PCR assay was performed by multiplex PCR. This    Assay tests for 66 bacterial and resistance gene targets.    Please refer to the [facility] Labs test directory    at [email or web address] for details.  Organism: Blood Culture PCR  Organism: Blood Culture PCR    Sensitivities:      Method Type: PCR      -  Escherichia coli: Detec      -  ESBL: Detec      -  CTX-M Resistance Marker: Detec    Rapid RVP Result: NotDetec ( @ 17:32)    RADIOLOGY:  imaging below personally reviewed    < from: CT Abdomen and Pelvis w/ Oral Cont and w/ IV Cont (23 @ 19:13) >  IMPRESSION:  Acute right pyelonephritis and ureteritis.  < end of copied text >   Patient is a 27y old  Female who presents with a chief complaint of bacteremia 2/2 pyelonephritis (2023 12:53)    HPI:  27F w/ PMHx SLE presenting for back pain, pyelonephritis and positive blood cultures. Patient was seen in the ED yesterday when she was diagnosed with Pyelo and placed in CDU, discharged on cefpodoxime. Blood cultures came back positive so she was told to come back to be admitted to the ED. Patient reports that her symptoms began on  with R sided back pain and fevers that brought her to the hospital at that time. Since discharge she has noticed L sided back pain and describes the sensation that her "ribs are being stabbed". She endorses vomiting and overall feeling worse. No changes in her urine.     In the ED she was given morphine, toradol, zofran, and a dose of zosyn. Blood cultures from last visit showed ESBL E coli.  (2023 12:53)     ID consulted for ESBL ecoli bacteremia. Endorsing R sided flank/back pain, no dysuria, no fever/chills currently    REVIEW OF SYSTEMS  [  ] ROS unobtainable because:    [ x ] All other systems negative except as noted below    Constitutional:  [ ] fever [ ] chills  [ ] weight loss   Skin:  [ ] rash [ ] phlebitis	  Eyes: [ ] icterus [ ] pain  [ ] discharge	  ENMT: [ ] sore throat  [ ] thrush   Respiratory: [ ] dyspnea  [ ] cough [ ] sputum	  Cardiovascular:  [ ] chest pain [ ] palpitations [ ] edema	  Gastrointestinal:  [ ] nausea [ ] vomiting [ ] diarrhea [ ] constipation [ ] pain	  Genitourinary:  [ ] dysuria [ ] frequency [ ] hematuria [ ] discharge [ ] flank pain   Musculoskeletal:  [ ] myalgias [ ] back pain  Neurological:  [ ] headache [ ] weakness   Extremities: No edema.       prior hospital charts reviewed [V]  primary team notes reviewed [V]  other consultant notes reviewed [V]      PAST MEDICAL & SURGICAL HISTORY:  Lupus    H/O breast augmentation    S/P tonsillectomy        FAMILY HISTORY:  FH: lupus erythematosus (Grandparent)      SOCIAL HISTORY:  Denied smoking, lives with family.       Allergies  hickory (Anaphylaxis)  Kiwi (Angioedema)  No Known Drug Allergies  strawberry (Angioedema)        ANTIMICROBIALS:  ertapenem  IVPB      ANTIMICROBIALS (past 90 days):   MEDICATIONS  (STANDING):    ertapenem  IVPB   120 mL/Hr IV Intermittent (23 @ 11:35)    piperacillin/tazobactam IVPB...   200 mL/Hr IV Intermittent (23 @ 04:17)    MEDICATIONS  (STANDING):  acetaminophen     Tablet .. 650 every 6 hours PRN  aluminum hydroxide/magnesium hydroxide/simethicone Suspension 30 every 4 hours PRN  ketorolac   Injectable 30 every 6 hours PRN  ketorolac   Injectable 15 every 6 hours PRN  melatonin 3 at bedtime PRN  ondansetron Injectable 4 every 8 hours PRN  polyethylene glycol 3350 17 daily  senna 2 at bedtime    VITALS:  Vital Signs Last 24 Hrs  T(F): 98 (23 @ 13:02), Max: 100.5 (23 @ 15:57)  Vital Signs Last 24 Hrs  HR: 61 (23 @ 13:02) (61 - 97)  BP: 106/67 (23 @ 13:02) (100/61 - 117/64)  RR: 18 (23 @ 13:02)  SpO2: 97% (23 @ 13:02) (97% - 99%)  Wt(kg): --      PHYSICAL EXAM:  Constitutional: non-toxic, no distress  HEAD/EYES: anicteric, no conjunctival injection  ENT:  supple, no thrush  Cardiac:   +S1/S2  Respiratory:  +BS bilaterally  GI:  soft, non-tender, +bowel sounds  :  b/l CVA tenderness, rt>lt   Musculoskeletal:  no synovitis, normal ROM  Neurologic: awake and alert,  no focal findings  Skin:  no rash, no erythema, no phlebitis  Vascular: warm extremities b/l  Psychiatric:  calm, cooperative      Labs:                        11.8   13.85 )-----------( 215      ( 2023 03:40 )             36.1     -    135  |  102  |  9   ----------------------------<  111<H>  4.2   |  23  |  0.69    Ca    8.6      2023 03:40    TPro  6.0  /  Alb  3.6  /  TBili  0.5  /  DBili  x   /  AST  26  /  ALT  19  /  AlkPhos  71  04-11    WBC Trend:  WBC Count: 13.85 (23 @ 03:40)  WBC Count: 14.24 (04-10-23 @ 15:48)  WBC Count: 18.08 (04-10-23 @ 09:01)  WBC Count: 17.54 (23 @ 16:59)    Auto Neutrophil #: 11.81 K/uL (23 @ 03:40)  Auto Neutrophil #: 11.67 K/uL (04-10-23 @ 15:48)  Auto Neutrophil #: 15.97 K/uL (04-10-23 @ 09:01)  Auto Neutrophil #: 15.64 K/uL (23 @ 16:59)    Auto Eosinophil %: 0.0 % (23 @ 03:40)  Auto Eosinophil %: 0.6 % (04-10-23 @ 15:48)  Auto Eosinophil %: 0.1 % (04-10-23 @ 09:01)  Auto Eosinophil %: 0.2 % (23 @ 16:59)    Urinalysis Basic - ( 2023 04:06 )    Color: Yellow / Appearance: Clear / S.016 / pH: x  Gluc: x / Ketone: Small  / Bili: Negative / Urobili: Negative   Blood: x / Protein: Trace / Nitrite: Negative   Leuk Esterase: Small / RBC: 43 /hpf / WBC 12 /HPF   Sq Epi: x / Non Sq Epi: x / Bacteria: Few    MICROBIOLOGY:  Culture - Blood (collected 2023 17:15)  Source: .Blood Blood-Peripheral  Preliminary Report:    No growth to date.    Culture - Urine (collected 2023 17:00)  Source: Clean Catch Clean Catch (Midstream)  Preliminary Report:    >100,000 CFU/ml Escherichia coli    Culture - Blood (collected 2023 17:00)  Source: .Blood Blood-Peripheral  Preliminary Report:    Growth in aerobic bottle: Escherichia coli    ***Blood Panel PCR results on this specimen are available    approximately 3 hours after the Gram stain result.***    Gram stain, PCR, and/or culture results may not always    correspond due to difference in methodologies.    ************************************************************    This PCR assay was performed by multiplex PCR. This    Assay tests for 66 bacterial and resistance gene targets.    Please refer to the [facility] Labs test directory    at [email or web address] for details.  Organism: Blood Culture PCR  Organism: Blood Culture PCR    Sensitivities:      Method Type: PCR      -  Escherichia coli: Detec      -  ESBL: Detec      -  CTX-M Resistance Marker: Detec    Rapid RVP Result: NotDetec ( @ 17:32)    RADIOLOGY:  imaging below personally reviewed    < from: CT Abdomen and Pelvis w/ Oral Cont and w/ IV Cont (23 @ 19:13) >  IMPRESSION:  Acute right pyelonephritis and ureteritis.     Patient is a 27y old  Female who presents with a chief complaint of bacteremia 2/2 pyelonephritis (2023 12:53)    HPI:  27F w/ PMHx SLE presenting for back pain, pyelonephritis and positive blood cultures. Patient was seen in the ED yesterday when she was diagnosed with Pyelo and placed in CDU, discharged on cefpodoxime. Blood cultures came back positive so she was told to come back to be admitted to the ED. Patient reports that her symptoms began on  with R sided back pain and fevers that brought her to the hospital at that time. Since discharge she has noticed L sided back pain and describes the sensation that her "ribs are being stabbed". She endorses vomiting and overall feeling worse. No changes in her urine.     In the ED she was given morphine, toradol, zofran, and a dose of zosyn. Blood cultures from last visit showed ESBL E coli.  (2023 12:53)     ID consulted for ESBL ecoli bacteremia. Endorsing R sided flank/back pain, no dysuria, no fever/chills currently    REVIEW OF SYSTEMS  [  ] ROS unobtainable because:    [ x ] All other systems negative except as noted below    Constitutional:  [ ] fever [ ] chills  [ ] weight loss   Skin:  [ ] rash [ ] phlebitis	  Eyes: [ ] icterus [ ] pain  [ ] discharge	  ENMT: [ ] sore throat  [ ] thrush   Respiratory: [ ] dyspnea  [ ] cough [ ] sputum	  Cardiovascular:  [ ] chest pain [ ] palpitations [ ] edema	  Gastrointestinal:  [ ] nausea [ ] vomiting [ ] diarrhea [ ] constipation [ ] pain	  Genitourinary:  [ ] dysuria [ ] frequency [ ] hematuria [ ] discharge [ ] flank pain   Musculoskeletal:  [ ] myalgias [ ] back pain  Neurological:  [ ] headache [ ] weakness   Extremities: No edema.       prior hospital charts reviewed [V]  primary team notes reviewed [V]  other consultant notes reviewed [V]      PAST MEDICAL & SURGICAL HISTORY:  Lupus    H/O breast augmentation    S/P tonsillectomy        FAMILY HISTORY:  FH: lupus erythematosus (Grandparent)      SOCIAL HISTORY:  Denied smoking, lives with family.       Allergies  hickory (Anaphylaxis)  Kiwi (Angioedema)  No Known Drug Allergies  strawberry (Angioedema)        ANTIMICROBIALS:  ertapenem  IVPB      ANTIMICROBIALS (past 90 days):   MEDICATIONS  (STANDING):    ertapenem  IVPB   120 mL/Hr IV Intermittent (23 @ 11:35)    piperacillin/tazobactam IVPB...   200 mL/Hr IV Intermittent (23 @ 04:17)    MEDICATIONS  (STANDING):  acetaminophen     Tablet .. 650 every 6 hours PRN  aluminum hydroxide/magnesium hydroxide/simethicone Suspension 30 every 4 hours PRN  ketorolac   Injectable 30 every 6 hours PRN  ketorolac   Injectable 15 every 6 hours PRN  melatonin 3 at bedtime PRN  ondansetron Injectable 4 every 8 hours PRN  polyethylene glycol 3350 17 daily  senna 2 at bedtime    VITALS:  Vital Signs Last 24 Hrs  T(F): 98 (23 @ 13:02), Max: 100.5 (23 @ 15:57)  Vital Signs Last 24 Hrs  HR: 61 (23 @ 13:02) (61 - 97)  BP: 106/67 (23 @ 13:02) (100/61 - 117/64)  RR: 18 (23 @ 13:02)  SpO2: 97% (23 @ 13:02) (97% - 99%)  Wt(kg): --      PHYSICAL EXAM:  Constitutional: non-toxic, no distress  HEAD/EYES: anicteric, no conjunctival injection  ENT:  supple, no thrush  Cardiac:   +S1/S2  Respiratory:  +BS bilaterally  GI:  soft, non-tender, +bowel sounds  :  b/l CVA tenderness, rt>lt   Musculoskeletal:  no synovitis, normal ROM  Neurologic: awake and alert,  no focal findings  Skin:  no rash, no erythema, no phlebitis  Vascular: warm extremities b/l  Psychiatric:  calm, cooperative      Labs:                        11.8   13.85 )-----------( 215      ( 2023 03:40 )             36.1     -    135  |  102  |  9   ----------------------------<  111<H>  4.2   |  23  |  0.69    Ca    8.6      2023 03:40    TPro  6.0  /  Alb  3.6  /  TBili  0.5  /  DBili  x   /  AST  26  /  ALT  19  /  AlkPhos  71  04-11    WBC Trend:  WBC Count: 13.85 (23 @ 03:40)  WBC Count: 14.24 (04-10-23 @ 15:48)  WBC Count: 18.08 (04-10-23 @ 09:01)  WBC Count: 17.54 (23 @ 16:59)    Auto Neutrophil #: 11.81 K/uL (23 @ 03:40)  Auto Neutrophil #: 11.67 K/uL (04-10-23 @ 15:48)  Auto Neutrophil #: 15.97 K/uL (04-10-23 @ 09:01)  Auto Neutrophil #: 15.64 K/uL (23 @ 16:59)    Auto Eosinophil %: 0.0 % (23 @ 03:40)  Auto Eosinophil %: 0.6 % (04-10-23 @ 15:48)  Auto Eosinophil %: 0.1 % (04-10-23 @ 09:01)  Auto Eosinophil %: 0.2 % (23 @ 16:59)    Urinalysis Basic - ( 2023 04:06 )    Color: Yellow / Appearance: Clear / S.016 / pH: x  Gluc: x / Ketone: Small  / Bili: Negative / Urobili: Negative   Blood: x / Protein: Trace / Nitrite: Negative   Leuk Esterase: Small / RBC: 43 /hpf / WBC 12 /HPF   Sq Epi: x / Non Sq Epi: x / Bacteria: Few    MICROBIOLOGY:  Culture - Blood (collected 2023 17:15)  Source: .Blood Blood-Peripheral  Preliminary Report:    No growth to date.    Culture - Urine (collected 2023 17:00)  Source: Clean Catch Clean Catch (Midstream)  Preliminary Report:    >100,000 CFU/ml Escherichia coli    Culture - Blood (collected 2023 17:00)  Source: .Blood Blood-Peripheral  Preliminary Report:    Growth in aerobic bottle: Escherichia coli    ***Blood Panel PCR results on this specimen are available    approximately 3 hours after the Gram stain result.***    Gram stain, PCR, and/or culture results may not always    correspond due to difference in methodologies.    ************************************************************    This PCR assay was performed by multiplex PCR. This    Assay tests for 66 bacterial and resistance gene targets.    Please refer to the [facility] test directory    at [email or web address] for details.  Organism: Blood Culture PCR  Organism: Blood Culture PCR    Sensitivities:      Method Type: PCR      -  Escherichia coli: Detec      -  ESBL: Detec      -  CTX-M Resistance Marker: Detec    Rapid RVP Result: NotDetec ( @ 17:32)    RADIOLOGY:  imaging below personally reviewed    < from: CT Abdomen and Pelvis w/ Oral Cont and w/ IV Cont (23 @ 19:13) >  IMPRESSION:  Acute right pyelonephritis and ureteritis.     Patient is a 27y old  Female who presents with a chief complaint of bacteremia 2/2 pyelonephritis (2023 12:53)    HPI:  27F w/ PMHx SLE presenting for back pain, pyelonephritis and positive blood cultures. Patient was seen in the ED yesterday when she was diagnosed with Pyelo and placed in CDU, discharged on cefpodoxime. Blood cultures came back positive so she was told to come back to be admitted to the ED. Patient reports that her symptoms began on  with R sided back pain and fevers that brought her to the hospital at that time. Since discharge she has noticed L sided back pain and describes the sensation that her "ribs are being stabbed". She endorses vomiting and overall feeling worse. No changes in her urine.     In the ED she was given morphine, toradol, zofran, and a dose of zosyn. Blood cultures from last visit showed ESBL E coli.  (2023 12:53)     ID consulted for ESBL ecoli bacteremia. Endorsing R sided flank/back pain, no dysuria, no fever/chills currently    REVIEW OF SYSTEMS  [  ] ROS unobtainable because:    [ x ] All other systems negative except as noted below    Constitutional:  [ ] fever [ ] chills  [ ] weight loss   Skin:  [ ] rash [ ] phlebitis	  Eyes: [ ] icterus [ ] pain  [ ] discharge	  ENMT: [ ] sore throat  [ ] thrush   Respiratory: [ ] dyspnea  [ ] cough [ ] sputum	  Cardiovascular:  [ ] chest pain [ ] palpitations [ ] edema	  Gastrointestinal:  [ ] nausea [ ] vomiting [ ] diarrhea [ ] constipation [ ] pain	  Genitourinary:  [ ] dysuria [ ] frequency [ ] hematuria [ ] discharge [ ] flank pain   Musculoskeletal:  [ ] myalgias [ ] back pain  Neurological:  [ ] headache [ ] weakness   Extremities: No edema.       prior hospital charts reviewed [V]  primary team notes reviewed [V]  other consultant notes reviewed [V]      PAST MEDICAL & SURGICAL HISTORY:  Lupus    H/O breast augmentation    S/P tonsillectomy        FAMILY HISTORY:  FH: lupus erythematosus (Grandparent)      SOCIAL HISTORY:  Denied smoking, lives with family.       Allergies  hickory (Anaphylaxis)  Kiwi (Angioedema)  No Known Drug Allergies  strawberry (Angioedema)        ANTIMICROBIALS:  ertapenem  IVPB      ANTIMICROBIALS (past 90 days):   MEDICATIONS  (STANDING):    ertapenem  IVPB   120 mL/Hr IV Intermittent (23 @ 11:35)    piperacillin/tazobactam IVPB...   200 mL/Hr IV Intermittent (23 @ 04:17)    MEDICATIONS  (STANDING):  acetaminophen     Tablet .. 650 every 6 hours PRN  aluminum hydroxide/magnesium hydroxide/simethicone Suspension 30 every 4 hours PRN  ketorolac   Injectable 30 every 6 hours PRN  ketorolac   Injectable 15 every 6 hours PRN  melatonin 3 at bedtime PRN  ondansetron Injectable 4 every 8 hours PRN  polyethylene glycol 3350 17 daily  senna 2 at bedtime    VITALS:  Vital Signs Last 24 Hrs  T(F): 98 (23 @ 13:02), Max: 100.5 (23 @ 15:57)  Vital Signs Last 24 Hrs  HR: 61 (23 @ 13:02) (61 - 97)  BP: 106/67 (23 @ 13:02) (100/61 - 117/64)  RR: 18 (23 @ 13:02)  SpO2: 97% (23 @ 13:02) (97% - 99%)  Wt(kg): --      PHYSICAL EXAM:  Constitutional: non-toxic, no distress  HEAD/EYES: anicteric, no conjunctival injection  ENT:  supple, no thrush  Cardiac:   +S1/S2  Respiratory:  +BS bilaterally  GI:  soft, non-tender, +bowel sounds  :  b/l CVA tenderness, rt>lt   Musculoskeletal:  no synovitis, normal ROM  Neurologic: awake and alert,  no focal findings  Skin:  no rash, no erythema, no phlebitis  Vascular: warm extremities b/l  Psychiatric:  calm, cooperative      Labs:                        11.8   13.85 )-----------( 215      ( 2023 03:40 )             36.1     -    135  |  102  |  9   ----------------------------<  111<H>  4.2   |  23  |  0.69    Ca    8.6      2023 03:40    TPro  6.0  /  Alb  3.6  /  TBili  0.5  /  DBili  x   /  AST  26  /  ALT  19  /  AlkPhos  71  04-11    WBC Trend:  WBC Count: 13.85 (23 @ 03:40)  WBC Count: 14.24 (04-10-23 @ 15:48)  WBC Count: 18.08 (04-10-23 @ 09:01)  WBC Count: 17.54 (23 @ 16:59)    Auto Neutrophil #: 11.81 K/uL (23 @ 03:40)  Auto Neutrophil #: 11.67 K/uL (04-10-23 @ 15:48)  Auto Neutrophil #: 15.97 K/uL (04-10-23 @ 09:01)  Auto Neutrophil #: 15.64 K/uL (23 @ 16:59)    Auto Eosinophil %: 0.0 % (23 @ 03:40)  Auto Eosinophil %: 0.6 % (04-10-23 @ 15:48)  Auto Eosinophil %: 0.1 % (04-10-23 @ 09:01)  Auto Eosinophil %: 0.2 % (23 @ 16:59)    Urinalysis Basic - ( 2023 04:06 )    Color: Yellow / Appearance: Clear / S.016 / pH: x  Gluc: x / Ketone: Small  / Bili: Negative / Urobili: Negative   Blood: x / Protein: Trace / Nitrite: Negative   Leuk Esterase: Small / RBC: 43 /hpf / WBC 12 /HPF   Sq Epi: x / Non Sq Epi: x / Bacteria: Few    MICROBIOLOGY:  Culture - Blood (collected 2023 17:15)  Source: .Blood Blood-Peripheral  Preliminary Report:    No growth to date.    Culture - Urine (collected 2023 17:00)  Source: Clean Catch Clean Catch (Midstream)  Preliminary Report:    >100,000 CFU/ml Escherichia coli    Culture - Blood (collected 2023 17:00)  Source: .Blood Blood-Peripheral  Preliminary Report:    Growth in aerobic bottle: Escherichia coli    ***Blood Panel PCR results on this specimen are available    approximately 3 hours after the Gram stain result.***    Gram stain, PCR, and/or culture results may not always    correspond due to difference in methodologies.    ************************************************************    This PCR assay was performed by multiplex PCR. This    Assay tests for 66 bacterial and resistance gene targets.    Please refer to the [facility] test directory    at [email or web address] for details.  Organism: Blood Culture PCR  Organism: Blood Culture PCR    Sensitivities:      Method Type: PCR      -  Escherichia coli: Detec      -  ESBL: Detec      -  CTX-M Resistance Marker: Detec    Rapid RVP Result: NotDetec ( @ 17:32)    RADIOLOGY:  imaging below personally reviewed    < from: CT Abdomen and Pelvis w/ Oral Cont and w/ IV Cont (23 @ 19:13) >  IMPRESSION:  Acute right pyelonephritis and ureteritis.

## 2023-04-11 NOTE — H&P ADULT - PROBLEM SELECTOR PLAN 1
Bacteremia 2/2 pyelonephritis, CT 4/9 showed R sided pyelo and ureteritis, patient reports that pain has now also spread to L flank  -Urine and Blood cultures from 4/9 showed ESBL E coli, pt was discharged on cefpodoxime (cultures not back at that time)  -s/p zosyn in the ED, given ESBL changed abx to Ertapenem  -repeat blood and urine cultures pending  -ID consulted  -Renal sono to look for pyelo on L side given progression of symptoms and look for abscess formation

## 2023-04-12 LAB
-  AMIKACIN: SIGNIFICANT CHANGE UP
-  AMPICILLIN/SULBACTAM: SIGNIFICANT CHANGE UP
-  AMPICILLIN: SIGNIFICANT CHANGE UP
-  AZTREONAM: SIGNIFICANT CHANGE UP
-  CEFAZOLIN: SIGNIFICANT CHANGE UP
-  CEFEPIME: SIGNIFICANT CHANGE UP
-  CEFTRIAXONE: SIGNIFICANT CHANGE UP
-  CIPROFLOXACIN: SIGNIFICANT CHANGE UP
-  ERTAPENEM: SIGNIFICANT CHANGE UP
-  GENTAMICIN: SIGNIFICANT CHANGE UP
-  IMIPENEM: SIGNIFICANT CHANGE UP
-  LEVOFLOXACIN: SIGNIFICANT CHANGE UP
-  MEROPENEM: SIGNIFICANT CHANGE UP
-  PIPERACILLIN/TAZOBACTAM: SIGNIFICANT CHANGE UP
-  TOBRAMYCIN: SIGNIFICANT CHANGE UP
-  TRIMETHOPRIM/SULFAMETHOXAZOLE: SIGNIFICANT CHANGE UP
ANION GAP SERPL CALC-SCNC: 7 MMOL/L — SIGNIFICANT CHANGE UP (ref 5–17)
BASOPHILS # BLD AUTO: 0.07 K/UL — SIGNIFICANT CHANGE UP (ref 0–0.2)
BASOPHILS NFR BLD AUTO: 0.9 % — SIGNIFICANT CHANGE UP (ref 0–2)
BUN SERPL-MCNC: 9 MG/DL — SIGNIFICANT CHANGE UP (ref 7–23)
CALCIUM SERPL-MCNC: 8.7 MG/DL — SIGNIFICANT CHANGE UP (ref 8.4–10.5)
CHLORIDE SERPL-SCNC: 107 MMOL/L — SIGNIFICANT CHANGE UP (ref 96–108)
CO2 SERPL-SCNC: 28 MMOL/L — SIGNIFICANT CHANGE UP (ref 22–31)
CREAT SERPL-MCNC: 0.63 MG/DL — SIGNIFICANT CHANGE UP (ref 0.5–1.3)
CULTURE RESULTS: NO GROWTH — SIGNIFICANT CHANGE UP
CULTURE RESULTS: SIGNIFICANT CHANGE UP
EGFR: 125 ML/MIN/1.73M2 — SIGNIFICANT CHANGE UP
EOSINOPHIL # BLD AUTO: 0.14 K/UL — SIGNIFICANT CHANGE UP (ref 0–0.5)
EOSINOPHIL NFR BLD AUTO: 1.8 % — SIGNIFICANT CHANGE UP (ref 0–6)
GLUCOSE SERPL-MCNC: 87 MG/DL — SIGNIFICANT CHANGE UP (ref 70–99)
HCT VFR BLD CALC: 34.5 % — SIGNIFICANT CHANGE UP (ref 34.5–45)
HGB BLD-MCNC: 11.3 G/DL — LOW (ref 11.5–15.5)
LYMPHOCYTES # BLD AUTO: 1.19 K/UL — SIGNIFICANT CHANGE UP (ref 1–3.3)
LYMPHOCYTES # BLD AUTO: 15.8 % — SIGNIFICANT CHANGE UP (ref 13–44)
MAGNESIUM SERPL-MCNC: 2 MG/DL — SIGNIFICANT CHANGE UP (ref 1.6–2.6)
MANUAL SMEAR VERIFICATION: SIGNIFICANT CHANGE UP
MCHC RBC-ENTMCNC: 30.8 PG — SIGNIFICANT CHANGE UP (ref 27–34)
MCHC RBC-ENTMCNC: 32.8 GM/DL — SIGNIFICANT CHANGE UP (ref 32–36)
MCV RBC AUTO: 94 FL — SIGNIFICANT CHANGE UP (ref 80–100)
METHOD TYPE: SIGNIFICANT CHANGE UP
MONOCYTES # BLD AUTO: 0.72 K/UL — SIGNIFICANT CHANGE UP (ref 0–0.9)
MONOCYTES NFR BLD AUTO: 9.6 % — SIGNIFICANT CHANGE UP (ref 2–14)
NEUTROPHILS # BLD AUTO: 5.43 K/UL — SIGNIFICANT CHANGE UP (ref 1.8–7.4)
NEUTROPHILS NFR BLD AUTO: 61.4 % — SIGNIFICANT CHANGE UP (ref 43–77)
NEUTS BAND # BLD: 10.5 % — HIGH (ref 0–8)
ORGANISM # SPEC MICROSCOPIC CNT: SIGNIFICANT CHANGE UP
PHOSPHATE SERPL-MCNC: 3 MG/DL — SIGNIFICANT CHANGE UP (ref 2.5–4.5)
PLAT MORPH BLD: NORMAL — SIGNIFICANT CHANGE UP
PLATELET # BLD AUTO: 201 K/UL — SIGNIFICANT CHANGE UP (ref 150–400)
POTASSIUM SERPL-MCNC: 4.6 MMOL/L — SIGNIFICANT CHANGE UP (ref 3.5–5.3)
POTASSIUM SERPL-SCNC: 4.6 MMOL/L — SIGNIFICANT CHANGE UP (ref 3.5–5.3)
RBC # BLD: 3.67 M/UL — LOW (ref 3.8–5.2)
RBC # FLD: 13.6 % — SIGNIFICANT CHANGE UP (ref 10.3–14.5)
RBC BLD AUTO: SIGNIFICANT CHANGE UP
SODIUM SERPL-SCNC: 142 MMOL/L — SIGNIFICANT CHANGE UP (ref 135–145)
SPECIMEN SOURCE: SIGNIFICANT CHANGE UP
WBC # BLD: 7.55 K/UL — SIGNIFICANT CHANGE UP (ref 3.8–10.5)
WBC # FLD AUTO: 7.55 K/UL — SIGNIFICANT CHANGE UP (ref 3.8–10.5)

## 2023-04-12 PROCEDURE — 99232 SBSQ HOSP IP/OBS MODERATE 35: CPT

## 2023-04-12 RX ORDER — MORPHINE SULFATE 50 MG/1
2 CAPSULE, EXTENDED RELEASE ORAL ONCE
Refills: 0 | Status: DISCONTINUED | OUTPATIENT
Start: 2023-04-12 | End: 2023-04-12

## 2023-04-12 RX ADMIN — Medication 650 MILLIGRAM(S): at 12:35

## 2023-04-12 RX ADMIN — Medication 650 MILLIGRAM(S): at 18:51

## 2023-04-12 RX ADMIN — MORPHINE SULFATE 2 MILLIGRAM(S): 50 CAPSULE, EXTENDED RELEASE ORAL at 00:43

## 2023-04-12 RX ADMIN — Medication 650 MILLIGRAM(S): at 11:53

## 2023-04-12 RX ADMIN — Medication 650 MILLIGRAM(S): at 19:51

## 2023-04-12 RX ADMIN — POLYETHYLENE GLYCOL 3350 17 GRAM(S): 17 POWDER, FOR SOLUTION ORAL at 11:05

## 2023-04-12 RX ADMIN — Medication 30 MILLIGRAM(S): at 08:35

## 2023-04-12 RX ADMIN — SENNA PLUS 2 TABLET(S): 8.6 TABLET ORAL at 22:17

## 2023-04-12 RX ADMIN — MORPHINE SULFATE 2 MILLIGRAM(S): 50 CAPSULE, EXTENDED RELEASE ORAL at 01:00

## 2023-04-12 RX ADMIN — ONDANSETRON 4 MILLIGRAM(S): 8 TABLET, FILM COATED ORAL at 08:18

## 2023-04-12 RX ADMIN — Medication 30 MILLIGRAM(S): at 07:58

## 2023-04-12 RX ADMIN — ERTAPENEM SODIUM 120 MILLIGRAM(S): 1 INJECTION, POWDER, LYOPHILIZED, FOR SOLUTION INTRAMUSCULAR; INTRAVENOUS at 11:04

## 2023-04-12 NOTE — PROGRESS NOTE ADULT - ASSESSMENT
28 y/o F PMHx SLE (receiving IVIG, no other active meds), initially presented on 4/9 with R sided flank/back pain. Found to have R pyelonephritis. Now called back for admission as blood culture growing ESBL e.coli.     Afebrile, HD stable  WBC 14K today  Urine Culture (4/9):  e.coli  Blood Cultures (4/9):  e.coli 1 bottle, CTX-M detected  CT Abd/Pelv (4/9):  acute R pyelonephritis and ureteritis    Impression:  #ESBL E.Coli Bacteremia   #Pyelonephritis  #Fever, Leukocytosis, resolved       Recs:  - continue with ertapenem 1g IV q24H  - repeat blood cultures NTD   - monitor wbc, leucocytosis resolved, bandemia on CBC today.   - urine GC/chlamydia in lab.   - will need midline eventually.       Plan discussed with Medicine Attending.        Garett Kidd  Please contact through MS Teams   If no response or past 5 pm/weekend call [phone]. 28 y/o F PMHx SLE (receiving IVIG, no other active meds), initially presented on 4/9 with R sided flank/back pain. Found to have R pyelonephritis. Now called back for admission as blood culture growing ESBL e.coli.     Afebrile, HD stable  WBC 14K today  Urine Culture (4/9):  e.coli  Blood Cultures (4/9):  e.coli 1 bottle, CTX-M detected  CT Abd/Pelv (4/9):  acute R pyelonephritis and ureteritis    Impression:  #ESBL E.Coli Bacteremia   #Pyelonephritis  #Fever, Leukocytosis, resolved       Recs:  - continue with ertapenem 1g IV q24H  - repeat blood cultures NTD   - monitor wbc, leucocytosis resolved, bandemia on CBC today.   - urine GC/chlamydia in lab.   - will need midline eventually.       Plan discussed with Medicine Attending.        Garett Kidd  Please contact through MS Teams   If no response or past 5 pm/weekend call [phone]. 26 y/o F PMHx SLE (receiving IVIG, no other active meds), initially presented on 4/9 with R sided flank/back pain. Found to have R pyelonephritis. Now called back for admission as blood culture growing ESBL e.coli.     Afebrile, HD stable  WBC 14K today  Urine Culture (4/9):  e.coli  Blood Cultures (4/9):  e.coli 1 bottle, CTX-M detected  CT Abd/Pelv (4/9):  acute R pyelonephritis and ureteritis    Impression:  #ESBL E.Coli Bacteremia   #Pyelonephritis  #Fever, Leukocytosis, resolved       Recs:  - continue with ertapenem 1g IV q24H  - repeat blood cultures NTD   - monitor wbc, leucocytosis resolved, bandemia on CBC today.   - urine GC/chlamydia in lab.   - will need midline eventually.       Plan discussed with Medicine Attending.        Garett Kidd  Please contact through MS Teams   If no response or past 5 pm/weekend call [phone].

## 2023-04-12 NOTE — PROGRESS NOTE ADULT - SUBJECTIVE AND OBJECTIVE BOX
[facility] Division of Hospital Medicine  Geremias MedhatpablodeeDO  Pager (CIERRA, 2Y-5U): [phone]  Other Times:  [phone]    Patient is a 27y old  Female who presents with a chief complaint of bacteremia 2/2 pyelonephritis (2023 14:52)    SUBJECTIVE / OVERNIGHT EVENTS: No acute events overnight. Patient seen and examined at bedside this morning, endorses hematuria, bilateral flank pain, suprapubic tenderness.    REVIEW OF SYSTEMS:    CONSTITUTIONAL: No further fevers or chills  EYES/ENT: No visual changes;  No vertigo or throat pain   NECK: No pain or stiffness  RESPIRATORY: No cough, wheezing, hemoptysis; No shortness of breath  CARDIOVASCULAR: No chest pain or palpitations  GASTROINTESTINAL: No abdominal or epigastric pain. No nausea, vomiting, or hematemesis; No diarrhea or constipation. No melena or hematochezia.  GENITOURINARY: No dysuria, frequency or hematuria  NEUROLOGICAL: No numbness or weakness  SKIN: No itching, burning, rashes, or lesions  MSK: No joint pain, no back pain  HEME: No easy bleeding, no easy bruising  All other review of systems is negative unless indicated above.    MEDICATIONS  (STANDING):  ertapenem  IVPB      ertapenem  IVPB 1000 milliGRAM(s) IV Intermittent every 24 hours  lactated ringers. 1000 milliLiter(s) (100 mL/Hr) IV Continuous <Continuous>  polyethylene glycol 3350 17 Gram(s) Oral daily  senna 2 Tablet(s) Oral at bedtime    MEDICATIONS  (PRN):  acetaminophen     Tablet .. 650 milliGRAM(s) Oral every 6 hours PRN Temp greater or equal to 38C (100.4F), Mild Pain (1 - 3)  aluminum hydroxide/magnesium hydroxide/simethicone Suspension 30 milliLiter(s) Oral every 4 hours PRN Dyspepsia  ketorolac   Injectable 30 milliGRAM(s) IV Push every 6 hours PRN Severe Pain (7 - 10)  ketorolac   Injectable 15 milliGRAM(s) IV Push every 6 hours PRN Moderate Pain (4 - 6)  melatonin 3 milliGRAM(s) Oral at bedtime PRN Insomnia  ondansetron Injectable 4 milliGRAM(s) IV Push every 8 hours PRN Nausea and/or Vomiting      CAPILLARY BLOOD GLUCOSE        I&O's Summary      PHYSICAL EXAM:  Vital Signs Last 24 Hrs  T(C): 36.9 (2023 05:02), Max: 36.9 (2023 05:02)  T(F): 98.5 (2023 05:02), Max: 98.5 (2023 05:02)  HR: 64 (2023 05:02) (61 - 91)  BP: 96/57 (2023 05:02) (96/57 - 101/64)  BP(mean): --  RR: 17 (2023 05:02) (17 - 18)  SpO2: 98% (2023 05:02) (94% - 98%)    Parameters below as of 2023 05:02  Patient On (Oxygen Delivery Method): room air      CONSTITUTIONAL: NAD, well-developed, well-groomed  EYES: PERRLA; conjunctiva and sclera clear  ENMT: Moist oral mucosa, no pharyngeal injection or exudates; normal dentition  NECK: Supple, no palpable masses; no thyromegaly  RESPIRATORY: Normal respiratory effort; lungs are clear to auscultation bilaterally  CARDIOVASCULAR: Regular rate and rhythm, normal S1 and S2, no murmur/rub/gallop; No lower extremity edema; Peripheral pulses are 2+ bilaterally  ABDOMEN: Nontender to palpation, normoactive bowel sounds, no rebound/guarding; No hepatosplenomegaly  MUSCULOSKELETAL:  Normal gait; no clubbing or cyanosis of digits; no joint swelling or tenderness to palpation  PSYCH: A+O to person, place, and time; affect appropriate  NEUROLOGY: CN 2-12 are intact and symmetric; no gross sensory deficits   SKIN: No rashes; no palpable lesions    LABS:                        11.3   7.55  )-----------( 201      ( 2023 08:55 )             34.5     04-12    142  |  107  |  9   ----------------------------<  87  4.6   |  28  |  0.63    Ca    8.7      2023 08:55  Phos  3.0     04-12  Mg     2.0     04-12    TPro  6.0  /  Alb  3.6  /  TBili  0.5  /  DBili  x   /  AST  26  /  ALT  19  /  AlkPhos  71  04-11    PT/INR - ( 2023 03:40 )   PT: 16.0 sec;   INR: 1.39 ratio         PTT - ( 2023 03:40 )  PTT:27.7 sec      Urinalysis Basic - ( 2023 04:06 )    Color: Yellow / Appearance: Clear / S.016 / pH: x  Gluc: x / Ketone: Small  / Bili: Negative / Urobili: Negative   Blood: x / Protein: Trace / Nitrite: Negative   Leuk Esterase: Small / RBC: 43 /hpf / WBC 12 /HPF   Sq Epi: x / Non Sq Epi: x / Bacteria: Few        Culture - Urine (collected 2023 04:10)  Source: Clean Catch Clean Catch (Midstream)  Final Report (2023 07:16):    No growth    Culture - Blood (collected 2023 03:30)  Source: .Blood Blood-Peripheral  Preliminary Report (2023 09:02):    No growth to date.    Culture - Blood (collected 2023 03:15)  Source: .Blood Blood-Peripheral  Preliminary Report (2023 09:02):    No growth to date.    Culture - Blood (collected 2023 17:15)  Source: .Blood Blood-Peripheral  Preliminary Report (10 Apr 2023 22:02):    No growth to date.    Culture - Urine (collected 2023 17:00)  Source: Clean Catch Clean Catch (Midstream)  Final Report (2023 17:55):    >100,000 CFU/ml Escherichia coli ESBL  Organism: Escherichia coli ESBL (2023 17:55)  Organism: Escherichia coli ESBL (2023 17:55)    Culture - Blood (collected 2023 17:00)  Source: .Blood Blood-Peripheral  Gram Stain (10 Apr 2023 19:31):    Growth in aerobic bottle: Gram Negative Rods  Final Report (2023 09:17):    Growth in aerobic bottle: Escherichia coli ESBL    ***Blood Panel PCR results on this specimen are available    approximately 3 hours after the Gram stain result.***    Gram stain, PCR, and/or culture results may not always    correspond due to difference in methodologies.    ************************************************************    This PCR assay was performed by multiplex PCR. This    Assay tests for 66 bacterial and resistance gene targets.    Please refer to the [facility] test directory    at [email or web address] for details.  Organism: Blood Culture PCR  Escherichia coli ESBL (2023 09:17)  Organism: Escherichia coli ESBL (2023 09:17)  Organism: Blood Culture PCR (2023 09:17)        RADIOLOGY & ADDITIONAL TESTS:  Results Reviewed:   Imaging Personally Reviewed:  Electrocardiogram Personally Reviewed:    COORDINATION OF CARE:  Care Discussed with Consultants/Other Providers [Y/N]:  Prior or Outpatient Records Reviewed [Y/N]:   [facility] Division of Hospital Medicine  Geremias MedhatpablodeeDO  Pager (CIER[facility], 2Z-3Y): [phone]  Other Times:  [phone]    Patient is a 27y old  Female who presents with a chief complaint of bacteremia 2/2 pyelonephritis (2023 14:52)    SUBJECTIVE / OVERNIGHT EVENTS: No acute events overnight. Patient seen and examined at bedside this morning, endorses hematuria, bilateral flank pain, suprapubic tenderness.    REVIEW OF SYSTEMS:    CONSTITUTIONAL: No further fevers or chills  EYES/ENT: No visual changes;  No vertigo or throat pain   NECK: No pain or stiffness  RESPIRATORY: No cough, wheezing, hemoptysis; No shortness of breath  CARDIOVASCULAR: No chest pain or palpitations  GASTROINTESTINAL: No abdominal or epigastric pain. No nausea, vomiting, or hematemesis; No diarrhea or constipation. No melena or hematochezia.  GENITOURINARY: No dysuria, frequency or hematuria  NEUROLOGICAL: No numbness or weakness  SKIN: No itching, burning, rashes, or lesions  MSK: No joint pain, no back pain  HEME: No easy bleeding, no easy bruising  All other review of systems is negative unless indicated above.    MEDICATIONS  (STANDING):  ertapenem  IVPB      ertapenem  IVPB 1000 milliGRAM(s) IV Intermittent every 24 hours  lactated ringers. 1000 milliLiter(s) (100 mL/Hr) IV Continuous <Continuous>  polyethylene glycol 3350 17 Gram(s) Oral daily  senna 2 Tablet(s) Oral at bedtime    MEDICATIONS  (PRN):  acetaminophen     Tablet .. 650 milliGRAM(s) Oral every 6 hours PRN Temp greater or equal to 38C (100.4F), Mild Pain (1 - 3)  aluminum hydroxide/magnesium hydroxide/simethicone Suspension 30 milliLiter(s) Oral every 4 hours PRN Dyspepsia  ketorolac   Injectable 30 milliGRAM(s) IV Push every 6 hours PRN Severe Pain (7 - 10)  ketorolac   Injectable 15 milliGRAM(s) IV Push every 6 hours PRN Moderate Pain (4 - 6)  melatonin 3 milliGRAM(s) Oral at bedtime PRN Insomnia  ondansetron Injectable 4 milliGRAM(s) IV Push every 8 hours PRN Nausea and/or Vomiting      CAPILLARY BLOOD GLUCOSE        I&O's Summary      PHYSICAL EXAM:  Vital Signs Last 24 Hrs  T(C): 36.9 (2023 05:02), Max: 36.9 (2023 05:02)  T(F): 98.5 (2023 05:02), Max: 98.5 (2023 05:02)  HR: 64 (2023 05:02) (61 - 91)  BP: 96/57 (2023 05:02) (96/57 - 101/64)  BP(mean): --  RR: 17 (2023 05:02) (17 - 18)  SpO2: 98% (2023 05:02) (94% - 98%)    Parameters below as of 2023 05:02  Patient On (Oxygen Delivery Method): room air      CONSTITUTIONAL: NAD, well-developed, well-groomed  EYES: PERRLA; conjunctiva and sclera clear  ENMT: Moist oral mucosa, no pharyngeal injection or exudates; normal dentition  NECK: Supple, no palpable masses; no thyromegaly  RESPIRATORY: Normal respiratory effort; lungs are clear to auscultation bilaterally  CARDIOVASCULAR: Regular rate and rhythm, normal S1 and S2, no murmur/rub/gallop; No lower extremity edema; Peripheral pulses are 2+ bilaterally  ABDOMEN: Nontender to palpation, normoactive bowel sounds, no rebound/guarding; No hepatosplenomegaly  MUSCULOSKELETAL:  Normal gait; no clubbing or cyanosis of digits; no joint swelling or tenderness to palpation  PSYCH: A+O to person, place, and time; affect appropriate  NEUROLOGY: CN 2-12 are intact and symmetric; no gross sensory deficits   SKIN: No rashes; no palpable lesions    LABS:                        11.3   7.55  )-----------( 201      ( 2023 08:55 )             34.5     04-12    142  |  107  |  9   ----------------------------<  87  4.6   |  28  |  0.63    Ca    8.7      2023 08:55  Phos  3.0     04-12  Mg     2.0     04-12    TPro  6.0  /  Alb  3.6  /  TBili  0.5  /  DBili  x   /  AST  26  /  ALT  19  /  AlkPhos  71  04-11    PT/INR - ( 2023 03:40 )   PT: 16.0 sec;   INR: 1.39 ratio         PTT - ( 2023 03:40 )  PTT:27.7 sec      Urinalysis Basic - ( 2023 04:06 )    Color: Yellow / Appearance: Clear / S.016 / pH: x  Gluc: x / Ketone: Small  / Bili: Negative / Urobili: Negative   Blood: x / Protein: Trace / Nitrite: Negative   Leuk Esterase: Small / RBC: 43 /hpf / WBC 12 /HPF   Sq Epi: x / Non Sq Epi: x / Bacteria: Few        Culture - Urine (collected 2023 04:10)  Source: Clean Catch Clean Catch (Midstream)  Final Report (2023 07:16):    No growth    Culture - Blood (collected 2023 03:30)  Source: .Blood Blood-Peripheral  Preliminary Report (2023 09:02):    No growth to date.    Culture - Blood (collected 2023 03:15)  Source: .Blood Blood-Peripheral  Preliminary Report (2023 09:02):    No growth to date.    Culture - Blood (collected 2023 17:15)  Source: .Blood Blood-Peripheral  Preliminary Report (10 Apr 2023 22:02):    No growth to date.    Culture - Urine (collected 2023 17:00)  Source: Clean Catch Clean Catch (Midstream)  Final Report (2023 17:55):    >100,000 CFU/ml Escherichia coli ESBL  Organism: Escherichia coli ESBL (2023 17:55)  Organism: Escherichia coli ESBL (2023 17:55)    Culture - Blood (collected 2023 17:00)  Source: .Blood Blood-Peripheral  Gram Stain (10 Apr 2023 19:31):    Growth in aerobic bottle: Gram Negative Rods  Final Report (2023 09:17):    Growth in aerobic bottle: Escherichia coli ESBL    ***Blood Panel PCR results on this specimen are available    approximately 3 hours after the Gram stain result.***    Gram stain, PCR, and/or culture results may not always    correspond due to difference in methodologies.    ************************************************************    This PCR assay was performed by multiplex PCR. This    Assay tests for 66 bacterial and resistance gene targets.    Please refer to the [facility] test directory    at [email or web address] for details.  Organism: Blood Culture PCR  Escherichia coli ESBL (2023 09:17)  Organism: Escherichia coli ESBL (2023 09:17)  Organism: Blood Culture PCR (2023 09:17)        RADIOLOGY & ADDITIONAL TESTS:  Results Reviewed:   Imaging Personally Reviewed:  Electrocardiogram Personally Reviewed:    COORDINATION OF CARE:  Care Discussed with Consultants/Other Providers [Y/N]:  Prior or Outpatient Records Reviewed [Y/N]:   [facility] Division of Hospital Medicine  Geremias MedhatpablodeeDO  Pager (CIER[facility], 8I-9Q): [phone]  Other Times:  [phone]    Patient is a 27y old  Female who presents with a chief complaint of bacteremia 2/2 pyelonephritis (2023 14:52)    SUBJECTIVE / OVERNIGHT EVENTS: No acute events overnight. Patient seen and examined at bedside this morning, endorses hematuria, bilateral flank pain, suprapubic tenderness.    REVIEW OF SYSTEMS:    CONSTITUTIONAL: No further fevers or chills  EYES/ENT: No visual changes;  No vertigo or throat pain   NECK: No pain or stiffness  RESPIRATORY: No cough, wheezing, hemoptysis; No shortness of breath  CARDIOVASCULAR: No chest pain or palpitations  GASTROINTESTINAL: No abdominal or epigastric pain. No nausea, vomiting, or hematemesis; No diarrhea or constipation. No melena or hematochezia.  GENITOURINARY: No dysuria, frequency or hematuria  NEUROLOGICAL: No numbness or weakness  SKIN: No itching, burning, rashes, or lesions  MSK: No joint pain, no back pain  HEME: No easy bleeding, no easy bruising  All other review of systems is negative unless indicated above.    MEDICATIONS  (STANDING):  ertapenem  IVPB      ertapenem  IVPB 1000 milliGRAM(s) IV Intermittent every 24 hours  lactated ringers. 1000 milliLiter(s) (100 mL/Hr) IV Continuous <Continuous>  polyethylene glycol 3350 17 Gram(s) Oral daily  senna 2 Tablet(s) Oral at bedtime    MEDICATIONS  (PRN):  acetaminophen     Tablet .. 650 milliGRAM(s) Oral every 6 hours PRN Temp greater or equal to 38C (100.4F), Mild Pain (1 - 3)  aluminum hydroxide/magnesium hydroxide/simethicone Suspension 30 milliLiter(s) Oral every 4 hours PRN Dyspepsia  ketorolac   Injectable 30 milliGRAM(s) IV Push every 6 hours PRN Severe Pain (7 - 10)  ketorolac   Injectable 15 milliGRAM(s) IV Push every 6 hours PRN Moderate Pain (4 - 6)  melatonin 3 milliGRAM(s) Oral at bedtime PRN Insomnia  ondansetron Injectable 4 milliGRAM(s) IV Push every 8 hours PRN Nausea and/or Vomiting      CAPILLARY BLOOD GLUCOSE        I&O's Summary      PHYSICAL EXAM:  Vital Signs Last 24 Hrs  T(C): 36.9 (2023 05:02), Max: 36.9 (2023 05:02)  T(F): 98.5 (2023 05:02), Max: 98.5 (2023 05:02)  HR: 64 (2023 05:02) (61 - 91)  BP: 96/57 (2023 05:02) (96/57 - 101/64)  BP(mean): --  RR: 17 (2023 05:02) (17 - 18)  SpO2: 98% (2023 05:02) (94% - 98%)    Parameters below as of 2023 05:02  Patient On (Oxygen Delivery Method): room air      CONSTITUTIONAL: NAD, well-developed, well-groomed  EYES: PERRLA; conjunctiva and sclera clear  ENMT: Moist oral mucosa, no pharyngeal injection or exudates; normal dentition  NECK: Supple, no palpable masses; no thyromegaly  RESPIRATORY: Normal respiratory effort; lungs are clear to auscultation bilaterally  CARDIOVASCULAR: Regular rate and rhythm, normal S1 and S2, no murmur/rub/gallop; No lower extremity edema; Peripheral pulses are 2+ bilaterally  ABDOMEN: Nontender to palpation, normoactive bowel sounds, no rebound/guarding; No hepatosplenomegaly  MUSCULOSKELETAL:  Normal gait; no clubbing or cyanosis of digits; no joint swelling or tenderness to palpation  PSYCH: A+O to person, place, and time; affect appropriate  NEUROLOGY: CN 2-12 are intact and symmetric; no gross sensory deficits   SKIN: No rashes; no palpable lesions    LABS:                        11.3   7.55  )-----------( 201      ( 2023 08:55 )             34.5     04-12    142  |  107  |  9   ----------------------------<  87  4.6   |  28  |  0.63    Ca    8.7      2023 08:55  Phos  3.0     04-12  Mg     2.0     04-12    TPro  6.0  /  Alb  3.6  /  TBili  0.5  /  DBili  x   /  AST  26  /  ALT  19  /  AlkPhos  71  04-11    PT/INR - ( 2023 03:40 )   PT: 16.0 sec;   INR: 1.39 ratio         PTT - ( 2023 03:40 )  PTT:27.7 sec      Urinalysis Basic - ( 2023 04:06 )    Color: Yellow / Appearance: Clear / S.016 / pH: x  Gluc: x / Ketone: Small  / Bili: Negative / Urobili: Negative   Blood: x / Protein: Trace / Nitrite: Negative   Leuk Esterase: Small / RBC: 43 /hpf / WBC 12 /HPF   Sq Epi: x / Non Sq Epi: x / Bacteria: Few        Culture - Urine (collected 2023 04:10)  Source: Clean Catch Clean Catch (Midstream)  Final Report (2023 07:16):    No growth    Culture - Blood (collected 2023 03:30)  Source: .Blood Blood-Peripheral  Preliminary Report (2023 09:02):    No growth to date.    Culture - Blood (collected 2023 03:15)  Source: .Blood Blood-Peripheral  Preliminary Report (2023 09:02):    No growth to date.    Culture - Blood (collected 2023 17:15)  Source: .Blood Blood-Peripheral  Preliminary Report (10 Apr 2023 22:02):    No growth to date.    Culture - Urine (collected 2023 17:00)  Source: Clean Catch Clean Catch (Midstream)  Final Report (2023 17:55):    >100,000 CFU/ml Escherichia coli ESBL  Organism: Escherichia coli ESBL (2023 17:55)  Organism: Escherichia coli ESBL (2023 17:55)    Culture - Blood (collected 2023 17:00)  Source: .Blood Blood-Peripheral  Gram Stain (10 Apr 2023 19:31):    Growth in aerobic bottle: Gram Negative Rods  Final Report (2023 09:17):    Growth in aerobic bottle: Escherichia coli ESBL    ***Blood Panel PCR results on this specimen are available    approximately 3 hours after the Gram stain result.***    Gram stain, PCR, and/or culture results may not always    correspond due to difference in methodologies.    ************************************************************    This PCR assay was performed by multiplex PCR. This    Assay tests for 66 bacterial and resistance gene targets.    Please refer to the [facility] test directory    at [email or web address] for details.  Organism: Blood Culture PCR  Escherichia coli ESBL (2023 09:17)  Organism: Escherichia coli ESBL (2023 09:17)  Organism: Blood Culture PCR (2023 09:17)        RADIOLOGY & ADDITIONAL TESTS:  Results Reviewed:   Imaging Personally Reviewed:  Electrocardiogram Personally Reviewed:    COORDINATION OF CARE:  Care Discussed with Consultants/Other Providers [Y/N]:  Prior or Outpatient Records Reviewed [Y/N]:   [facility] Division of Hospital Medicine  Geremias MedhatpablodeeDO  Pager (CIER[facility], 3H-0O): [phone]  Other Times:  [phone]    Patient is a 27y old  Female who presents with a chief complaint of bacteremia 2/2 pyelonephritis (2023 14:52)    SUBJECTIVE / OVERNIGHT EVENTS: No acute events overnight. Patient seen and examined at bedside this morning, endorses hematuria, bilateral flank pain, suprapubic tenderness.    REVIEW OF SYSTEMS:    CONSTITUTIONAL: No further fevers or chills  EYES/ENT: No visual changes;  No vertigo or throat pain   NECK: No pain or stiffness  RESPIRATORY: No cough, wheezing, hemoptysis; No shortness of breath  CARDIOVASCULAR: No chest pain or palpitations  GASTROINTESTINAL: No abdominal or epigastric pain. No nausea, vomiting, or hematemesis; No diarrhea or constipation. No melena or hematochezia.  GENITOURINARY: No dysuria, frequency or hematuria  NEUROLOGICAL: No numbness or weakness  SKIN: No itching, burning, rashes, or lesions  MSK: No joint pain, no back pain  HEME: No easy bleeding, no easy bruising  All other review of systems is negative unless indicated above.    MEDICATIONS  (STANDING):  ertapenem  IVPB      ertapenem  IVPB 1000 milliGRAM(s) IV Intermittent every 24 hours  lactated ringers. 1000 milliLiter(s) (100 mL/Hr) IV Continuous <Continuous>  polyethylene glycol 3350 17 Gram(s) Oral daily  senna 2 Tablet(s) Oral at bedtime    MEDICATIONS  (PRN):  acetaminophen     Tablet .. 650 milliGRAM(s) Oral every 6 hours PRN Temp greater or equal to 38C (100.4F), Mild Pain (1 - 3)  aluminum hydroxide/magnesium hydroxide/simethicone Suspension 30 milliLiter(s) Oral every 4 hours PRN Dyspepsia  ketorolac   Injectable 30 milliGRAM(s) IV Push every 6 hours PRN Severe Pain (7 - 10)  ketorolac   Injectable 15 milliGRAM(s) IV Push every 6 hours PRN Moderate Pain (4 - 6)  melatonin 3 milliGRAM(s) Oral at bedtime PRN Insomnia  ondansetron Injectable 4 milliGRAM(s) IV Push every 8 hours PRN Nausea and/or Vomiting      CAPILLARY BLOOD GLUCOSE        I&O's Summary      PHYSICAL EXAM:  Vital Signs Last 24 Hrs  T(C): 36.9 (2023 05:02), Max: 36.9 (2023 05:02)  T(F): 98.5 (2023 05:02), Max: 98.5 (2023 05:02)  HR: 64 (2023 05:02) (61 - 91)  BP: 96/57 (2023 05:02) (96/57 - 101/64)  BP(mean): --  RR: 17 (2023 05:02) (17 - 18)  SpO2: 98% (2023 05:02) (94% - 98%)    Parameters below as of 2023 05:02  Patient On (Oxygen Delivery Method): room air    CONSTITUTIONAL: NAD, well-developed, well-groomed  EYES: EOMI; conjunctiva and sclera clear  ENMT: Moist oral mucosa, normal dentition  ABDOMEN: +suprapubic tenderness, no rebound/guarding  PSYCH: A+O to person, place, and time; affect appropriate  NEUROLOGY: No focal deficits   SKIN: No rashes; no palpable lesions    LABS:                        11.3   7.55  )-----------( 201      ( 2023 08:55 )             34.5     04-12    142  |  107  |  9   ----------------------------<  87  4.6   |  28  |  0.63    Ca    8.7      2023 08:55  Phos  3.0     04-12  Mg     2.0     04-12    TPro  6.0  /  Alb  3.6  /  TBili  0.5  /  DBili  x   /  AST  26  /  ALT  19  /  AlkPhos  71  04-11    PT/INR - ( 2023 03:40 )   PT: 16.0 sec;   INR: 1.39 ratio         PTT - ( 2023 03:40 )  PTT:27.7 sec      Urinalysis Basic - ( 2023 04:06 )    Color: Yellow / Appearance: Clear / S.016 / pH: x  Gluc: x / Ketone: Small  / Bili: Negative / Urobili: Negative   Blood: x / Protein: Trace / Nitrite: Negative   Leuk Esterase: Small / RBC: 43 /hpf / WBC 12 /HPF   Sq Epi: x / Non Sq Epi: x / Bacteria: Few        Culture - Urine (collected 2023 04:10)  Source: Clean Catch Clean Catch (Midstream)  Final Report (2023 07:16):    No growth    Culture - Blood (collected 2023 03:30)  Source: .Blood Blood-Peripheral  Preliminary Report (2023 09:02):    No growth to date.    Culture - Blood (collected 2023 03:15)  Source: .Blood Blood-Peripheral  Preliminary Report (2023 09:02):    No growth to date.    Culture - Blood (collected 2023 17:15)  Source: .Blood Blood-Peripheral  Preliminary Report (10 Apr 2023 22:02):    No growth to date.    Culture - Urine (collected 2023 17:00)  Source: Clean Catch Clean Catch (Midstream)  Final Report (2023 17:55):    >100,000 CFU/ml Escherichia coli ESBL  Organism: Escherichia coli ESBL (2023 17:55)  Organism: Escherichia coli ESBL (2023 17:55)    Culture - Blood (collected 2023 17:00)  Source: .Blood Blood-Peripheral  Gram Stain (10 Apr 2023 19:31):    Growth in aerobic bottle: Gram Negative Rods  Final Report (2023 09:17):    Growth in aerobic bottle: Escherichia coli ESBL    ***Blood Panel PCR results on this specimen are available    approximately 3 hours after the Gram stain result.***    Gram stain, PCR, and/or culture results may not always    correspond due to difference in methodologies.    ************************************************************    This PCR assay was performed by multiplex PCR. This    Assay tests for 66 bacterial and resistance gene targets.    Please refer to the [facility] Labs test directory    at [email or web address] for details.  Organism: Blood Culture PCR  Escherichia coli ESBL (2023 09:17)  Organism: Escherichia coli ESBL (2023 09:17)  Organism: Blood Culture PCR (:17) [facility] Division of Hospital Medicine  Geremias MedhatpablodeeDO  Pager (CIERRA, 3R-2C): [phone]  Other Times:  [phone]    Patient is a 27y old  Female who presents with a chief complaint of bacteremia 2/2 pyelonephritis (2023 14:52)    SUBJECTIVE / OVERNIGHT EVENTS: No acute events overnight. Patient seen and examined at bedside this morning, endorses hematuria, bilateral flank pain, suprapubic tenderness.    REVIEW OF SYSTEMS:    CONSTITUTIONAL: No further fevers or chills  EYES/ENT: No visual changes;  No vertigo or throat pain   NECK: No pain or stiffness  RESPIRATORY: No cough, wheezing, hemoptysis; No shortness of breath  CARDIOVASCULAR: No chest pain or palpitations  GASTROINTESTINAL: No abdominal or epigastric pain. No nausea, vomiting, or hematemesis; No diarrhea or constipation. No melena or hematochezia.  GENITOURINARY: No dysuria, frequency or hematuria  NEUROLOGICAL: No numbness or weakness  SKIN: No itching, burning, rashes, or lesions  MSK: No joint pain, no back pain  HEME: No easy bleeding, no easy bruising  All other review of systems is negative unless indicated above.    MEDICATIONS  (STANDING):  ertapenem  IVPB      ertapenem  IVPB 1000 milliGRAM(s) IV Intermittent every 24 hours  lactated ringers. 1000 milliLiter(s) (100 mL/Hr) IV Continuous <Continuous>  polyethylene glycol 3350 17 Gram(s) Oral daily  senna 2 Tablet(s) Oral at bedtime    MEDICATIONS  (PRN):  acetaminophen     Tablet .. 650 milliGRAM(s) Oral every 6 hours PRN Temp greater or equal to 38C (100.4F), Mild Pain (1 - 3)  aluminum hydroxide/magnesium hydroxide/simethicone Suspension 30 milliLiter(s) Oral every 4 hours PRN Dyspepsia  ketorolac   Injectable 30 milliGRAM(s) IV Push every 6 hours PRN Severe Pain (7 - 10)  ketorolac   Injectable 15 milliGRAM(s) IV Push every 6 hours PRN Moderate Pain (4 - 6)  melatonin 3 milliGRAM(s) Oral at bedtime PRN Insomnia  ondansetron Injectable 4 milliGRAM(s) IV Push every 8 hours PRN Nausea and/or Vomiting      CAPILLARY BLOOD GLUCOSE        I&O's Summary      PHYSICAL EXAM:  Vital Signs Last 24 Hrs  T(C): 36.9 (2023 05:02), Max: 36.9 (2023 05:02)  T(F): 98.5 (2023 05:02), Max: 98.5 (2023 05:02)  HR: 64 (2023 05:02) (61 - 91)  BP: 96/57 (2023 05:02) (96/57 - 101/64)  BP(mean): --  RR: 17 (2023 05:02) (17 - 18)  SpO2: 98% (2023 05:02) (94% - 98%)    Parameters below as of 2023 05:02  Patient On (Oxygen Delivery Method): room air    CONSTITUTIONAL: NAD, well-developed, well-groomed  EYES: EOMI; conjunctiva and sclera clear  ENMT: Moist oral mucosa, normal dentition  ABDOMEN: +suprapubic tenderness, no rebound/guarding  PSYCH: A+O to person, place, and time; affect appropriate  NEUROLOGY: No focal deficits   SKIN: No rashes; no palpable lesions    LABS:                        11.3   7.55  )-----------( 201      ( 2023 08:55 )             34.5     04-12    142  |  107  |  9   ----------------------------<  87  4.6   |  28  |  0.63    Ca    8.7      2023 08:55  Phos  3.0     04-12  Mg     2.0     04-12    TPro  6.0  /  Alb  3.6  /  TBili  0.5  /  DBili  x   /  AST  26  /  ALT  19  /  AlkPhos  71  04-11    PT/INR - ( 2023 03:40 )   PT: 16.0 sec;   INR: 1.39 ratio         PTT - ( 2023 03:40 )  PTT:27.7 sec      Urinalysis Basic - ( 2023 04:06 )    Color: Yellow / Appearance: Clear / S.016 / pH: x  Gluc: x / Ketone: Small  / Bili: Negative / Urobili: Negative   Blood: x / Protein: Trace / Nitrite: Negative   Leuk Esterase: Small / RBC: 43 /hpf / WBC 12 /HPF   Sq Epi: x / Non Sq Epi: x / Bacteria: Few        Culture - Urine (collected 2023 04:10)  Source: Clean Catch Clean Catch (Midstream)  Final Report (2023 07:16):    No growth    Culture - Blood (collected 2023 03:30)  Source: .Blood Blood-Peripheral  Preliminary Report (2023 09:02):    No growth to date.    Culture - Blood (collected 2023 03:15)  Source: .Blood Blood-Peripheral  Preliminary Report (2023 09:02):    No growth to date.    Culture - Blood (collected 2023 17:15)  Source: .Blood Blood-Peripheral  Preliminary Report (10 Apr 2023 22:02):    No growth to date.    Culture - Urine (collected 2023 17:00)  Source: Clean Catch Clean Catch (Midstream)  Final Report (2023 17:55):    >100,000 CFU/ml Escherichia coli ESBL  Organism: Escherichia coli ESBL (2023 17:55)  Organism: Escherichia coli ESBL (2023 17:55)    Culture - Blood (collected 2023 17:00)  Source: .Blood Blood-Peripheral  Gram Stain (10 Apr 2023 19:31):    Growth in aerobic bottle: Gram Negative Rods  Final Report (2023 09:17):    Growth in aerobic bottle: Escherichia coli ESBL    ***Blood Panel PCR results on this specimen are available    approximately 3 hours after the Gram stain result.***    Gram stain, PCR, and/or culture results may not always    correspond due to difference in methodologies.    ************************************************************    This PCR assay was performed by multiplex PCR. This    Assay tests for 66 bacterial and resistance gene targets.    Please refer to the [facility] Labs test directory    at [email or web address] for details.  Organism: Blood Culture PCR  Escherichia coli ESBL (2023 09:17)  Organism: Escherichia coli ESBL (2023 09:17)  Organism: Blood Culture PCR (:17) [facility] Division of Hospital Medicine  Geremias MedhatpablodeeDO  Pager (CIER[facility], 3A-3D): [phone]  Other Times:  [phone]    Patient is a 27y old  Female who presents with a chief complaint of bacteremia 2/2 pyelonephritis (2023 14:52)    SUBJECTIVE / OVERNIGHT EVENTS: No acute events overnight. Patient seen and examined at bedside this morning, endorses hematuria, bilateral flank pain, suprapubic tenderness.    REVIEW OF SYSTEMS:    CONSTITUTIONAL: No further fevers or chills  EYES/ENT: No visual changes;  No vertigo or throat pain   NECK: No pain or stiffness  RESPIRATORY: No cough, wheezing, hemoptysis; No shortness of breath  CARDIOVASCULAR: No chest pain or palpitations  GASTROINTESTINAL: No abdominal or epigastric pain. No nausea, vomiting, or hematemesis; No diarrhea or constipation. No melena or hematochezia.  GENITOURINARY: No dysuria, frequency or hematuria  NEUROLOGICAL: No numbness or weakness  SKIN: No itching, burning, rashes, or lesions  MSK: No joint pain, no back pain  HEME: No easy bleeding, no easy bruising  All other review of systems is negative unless indicated above.    MEDICATIONS  (STANDING):  ertapenem  IVPB      ertapenem  IVPB 1000 milliGRAM(s) IV Intermittent every 24 hours  lactated ringers. 1000 milliLiter(s) (100 mL/Hr) IV Continuous <Continuous>  polyethylene glycol 3350 17 Gram(s) Oral daily  senna 2 Tablet(s) Oral at bedtime    MEDICATIONS  (PRN):  acetaminophen     Tablet .. 650 milliGRAM(s) Oral every 6 hours PRN Temp greater or equal to 38C (100.4F), Mild Pain (1 - 3)  aluminum hydroxide/magnesium hydroxide/simethicone Suspension 30 milliLiter(s) Oral every 4 hours PRN Dyspepsia  ketorolac   Injectable 30 milliGRAM(s) IV Push every 6 hours PRN Severe Pain (7 - 10)  ketorolac   Injectable 15 milliGRAM(s) IV Push every 6 hours PRN Moderate Pain (4 - 6)  melatonin 3 milliGRAM(s) Oral at bedtime PRN Insomnia  ondansetron Injectable 4 milliGRAM(s) IV Push every 8 hours PRN Nausea and/or Vomiting      CAPILLARY BLOOD GLUCOSE        I&O's Summary      PHYSICAL EXAM:  Vital Signs Last 24 Hrs  T(C): 36.9 (2023 05:02), Max: 36.9 (2023 05:02)  T(F): 98.5 (2023 05:02), Max: 98.5 (2023 05:02)  HR: 64 (2023 05:02) (61 - 91)  BP: 96/57 (2023 05:02) (96/57 - 101/64)  BP(mean): --  RR: 17 (2023 05:02) (17 - 18)  SpO2: 98% (2023 05:02) (94% - 98%)    Parameters below as of 2023 05:02  Patient On (Oxygen Delivery Method): room air    CONSTITUTIONAL: NAD, well-developed, well-groomed  EYES: EOMI; conjunctiva and sclera clear  ENMT: Moist oral mucosa, normal dentition  ABDOMEN: +suprapubic tenderness, no rebound/guarding  PSYCH: A+O to person, place, and time; affect appropriate  NEUROLOGY: No focal deficits   SKIN: No rashes; no palpable lesions    LABS:                        11.3   7.55  )-----------( 201      ( 2023 08:55 )             34.5     04-12    142  |  107  |  9   ----------------------------<  87  4.6   |  28  |  0.63    Ca    8.7      2023 08:55  Phos  3.0     04-12  Mg     2.0     04-12    TPro  6.0  /  Alb  3.6  /  TBili  0.5  /  DBili  x   /  AST  26  /  ALT  19  /  AlkPhos  71  04-11    PT/INR - ( 2023 03:40 )   PT: 16.0 sec;   INR: 1.39 ratio         PTT - ( 2023 03:40 )  PTT:27.7 sec      Urinalysis Basic - ( 2023 04:06 )    Color: Yellow / Appearance: Clear / S.016 / pH: x  Gluc: x / Ketone: Small  / Bili: Negative / Urobili: Negative   Blood: x / Protein: Trace / Nitrite: Negative   Leuk Esterase: Small / RBC: 43 /hpf / WBC 12 /HPF   Sq Epi: x / Non Sq Epi: x / Bacteria: Few        Culture - Urine (collected 2023 04:10)  Source: Clean Catch Clean Catch (Midstream)  Final Report (2023 07:16):    No growth    Culture - Blood (collected 2023 03:30)  Source: .Blood Blood-Peripheral  Preliminary Report (2023 09:02):    No growth to date.    Culture - Blood (collected 2023 03:15)  Source: .Blood Blood-Peripheral  Preliminary Report (2023 09:02):    No growth to date.    Culture - Blood (collected 2023 17:15)  Source: .Blood Blood-Peripheral  Preliminary Report (10 Apr 2023 22:02):    No growth to date.    Culture - Urine (collected 2023 17:00)  Source: Clean Catch Clean Catch (Midstream)  Final Report (2023 17:55):    >100,000 CFU/ml Escherichia coli ESBL  Organism: Escherichia coli ESBL (2023 17:55)  Organism: Escherichia coli ESBL (2023 17:55)    Culture - Blood (collected 2023 17:00)  Source: .Blood Blood-Peripheral  Gram Stain (10 Apr 2023 19:31):    Growth in aerobic bottle: Gram Negative Rods  Final Report (2023 09:17):    Growth in aerobic bottle: Escherichia coli ESBL    ***Blood Panel PCR results on this specimen are available    approximately 3 hours after the Gram stain result.***    Gram stain, PCR, and/or culture results may not always    correspond due to difference in methodologies.    ************************************************************    This PCR assay was performed by multiplex PCR. This    Assay tests for 66 bacterial and resistance gene targets.    Please refer to the [facility] Labs test directory    at [email or web address] for details.  Organism: Blood Culture PCR  Escherichia coli ESBL (2023 09:17)  Organism: Escherichia coli ESBL (2023 09:17)  Organism: Blood Culture PCR (:17) [facility] Division of Hospital Medicine  Geremias MedhatpablodeeDO  Pager ([facility], 5I-1C): [phone]  Other Times:  [phone]    Patient is a 27y old  Female who presents with a chief complaint of bacteremia 2/2 pyelonephritis (2023 14:52)    SUBJECTIVE / OVERNIGHT EVENTS: No acute events overnight. Patient seen and examined at bedside this morning, endorses hematuria, bilateral flank pain, suprapubic tenderness.    REVIEW OF SYSTEMS:    CONSTITUTIONAL: No further fevers or chills  EYES/ENT: No visual changes;  No vertigo or throat pain   NECK: No pain or stiffness  RESPIRATORY: No cough, wheezing, hemoptysis; No shortness of breath  CARDIOVASCULAR: No chest pain or palpitations  GASTROINTESTINAL: No abdominal or epigastric pain. No nausea, vomiting, or hematemesis; No diarrhea or constipation. No melena or hematochezia.  GENITOURINARY: Endorses hematuria, bilateral flank pain, suprapubic tenderness  NEUROLOGICAL: No numbness or weakness  SKIN: No itching, burning, rashes, or lesions  MSK: No joint pain, no back pain  HEME: No easy bleeding, no easy bruising  All other review of systems is negative unless indicated above.    MEDICATIONS  (STANDING):  ertapenem  IVPB      ertapenem  IVPB 1000 milliGRAM(s) IV Intermittent every 24 hours  lactated ringers. 1000 milliLiter(s) (100 mL/Hr) IV Continuous <Continuous>  polyethylene glycol 3350 17 Gram(s) Oral daily  senna 2 Tablet(s) Oral at bedtime    MEDICATIONS  (PRN):  acetaminophen     Tablet .. 650 milliGRAM(s) Oral every 6 hours PRN Temp greater or equal to 38C (100.4F), Mild Pain (1 - 3)  aluminum hydroxide/magnesium hydroxide/simethicone Suspension 30 milliLiter(s) Oral every 4 hours PRN Dyspepsia  ketorolac   Injectable 30 milliGRAM(s) IV Push every 6 hours PRN Severe Pain (7 - 10)  ketorolac   Injectable 15 milliGRAM(s) IV Push every 6 hours PRN Moderate Pain (4 - 6)  melatonin 3 milliGRAM(s) Oral at bedtime PRN Insomnia  ondansetron Injectable 4 milliGRAM(s) IV Push every 8 hours PRN Nausea and/or Vomiting      CAPILLARY BLOOD GLUCOSE        I&O's Summary      PHYSICAL EXAM:  Vital Signs Last 24 Hrs  T(C): 36.9 (2023 05:02), Max: 36.9 (2023 05:02)  T(F): 98.5 (2023 05:02), Max: 98.5 (2023 05:02)  HR: 64 (2023 05:02) (61 - 91)  BP: 96/57 (2023 05:02) (96/57 - 101/64)  BP(mean): --  RR: 17 (2023 05:02) (17 - 18)  SpO2: 98% (2023 05:02) (94% - 98%)    Parameters below as of 2023 05:02  Patient On (Oxygen Delivery Method): room air    CONSTITUTIONAL: NAD, well-developed, well-groomed  EYES: EOMI; conjunctiva and sclera clear  ENMT: Moist oral mucosa, normal dentition  ABDOMEN: +suprapubic tenderness, no rebound/guarding  PSYCH: A+O to person, place, and time; affect appropriate  NEUROLOGY: No focal deficits   SKIN: No rashes; no palpable lesions    LABS:                        11.3   7.55  )-----------( 201      ( 2023 08:55 )             34.5     04-12    142  |  107  |  9   ----------------------------<  87  4.6   |  28  |  0.63    Ca    8.7      2023 08:55  Phos  3.0     04-12  Mg     2.0     04-12    TPro  6.0  /  Alb  3.6  /  TBili  0.5  /  DBili  x   /  AST  26  /  ALT  19  /  AlkPhos  71  04-11    PT/INR - ( 2023 03:40 )   PT: 16.0 sec;   INR: 1.39 ratio         PTT - ( 2023 03:40 )  PTT:27.7 sec      Urinalysis Basic - ( 2023 04:06 )    Color: Yellow / Appearance: Clear / S.016 / pH: x  Gluc: x / Ketone: Small  / Bili: Negative / Urobili: Negative   Blood: x / Protein: Trace / Nitrite: Negative   Leuk Esterase: Small / RBC: 43 /hpf / WBC 12 /HPF   Sq Epi: x / Non Sq Epi: x / Bacteria: Few        Culture - Urine (collected 2023 04:10)  Source: Clean Catch Clean Catch (Midstream)  Final Report (2023 07:16):    No growth    Culture - Blood (collected 2023 03:30)  Source: .Blood Blood-Peripheral  Preliminary Report (2023 09:02):    No growth to date.    Culture - Blood (collected 2023 03:15)  Source: .Blood Blood-Peripheral  Preliminary Report (2023 09:02):    No growth to date.    Culture - Blood (collected 2023 17:15)  Source: .Blood Blood-Peripheral  Preliminary Report (10 Apr 2023 22:02):    No growth to date.    Culture - Urine (collected 2023 17:00)  Source: Clean Catch Clean Catch (Midstream)  Final Report (2023 17:55):    >100,000 CFU/ml Escherichia coli ESBL  Organism: Escherichia coli ESBL (2023 17:55)  Organism: Escherichia coli ESBL (2023 17:55)    Culture - Blood (collected 2023 17:00)  Source: .Blood Blood-Peripheral  Gram Stain (10 Apr 2023 19:31):    Growth in aerobic bottle: Gram Negative Rods  Final Report (2023 09:17):    Growth in aerobic bottle: Escherichia coli ESBL    ***Blood Panel PCR results on this specimen are available    approximately 3 hours after the Gram stain result.***    Gram stain, PCR, and/or culture results may not always    correspond due to difference in methodologies.    ************************************************************    This PCR assay was performed by multiplex PCR. This    Assay tests for 66 bacterial and resistance gene targets.    Please refer to the [facility] Labs test directory    at [email or web address] for details.  Organism: Blood Culture PCR  Escherichia coli ESBL (2023 09:17)  Organism: Escherichia coli ESBL (2023 09:17)  Organism: Blood Culture PCR (2023 09:17) [facility] Division of Hospital Medicine  Geremias MedhatpablodeeDO  Pager ([facility], 7A-3A): [phone]  Other Times:  [phone]    Patient is a 27y old  Female who presents with a chief complaint of bacteremia 2/2 pyelonephritis (2023 14:52)    SUBJECTIVE / OVERNIGHT EVENTS: No acute events overnight. Patient seen and examined at bedside this morning, endorses hematuria, bilateral flank pain, suprapubic tenderness.    REVIEW OF SYSTEMS:    CONSTITUTIONAL: No further fevers or chills  EYES/ENT: No visual changes;  No vertigo or throat pain   NECK: No pain or stiffness  RESPIRATORY: No cough, wheezing, hemoptysis; No shortness of breath  CARDIOVASCULAR: No chest pain or palpitations  GASTROINTESTINAL: No abdominal or epigastric pain. No nausea, vomiting, or hematemesis; No diarrhea or constipation. No melena or hematochezia.  GENITOURINARY: Endorses hematuria, bilateral flank pain, suprapubic tenderness  NEUROLOGICAL: No numbness or weakness  SKIN: No itching, burning, rashes, or lesions  MSK: No joint pain, no back pain  HEME: No easy bleeding, no easy bruising  All other review of systems is negative unless indicated above.    MEDICATIONS  (STANDING):  ertapenem  IVPB      ertapenem  IVPB 1000 milliGRAM(s) IV Intermittent every 24 hours  lactated ringers. 1000 milliLiter(s) (100 mL/Hr) IV Continuous <Continuous>  polyethylene glycol 3350 17 Gram(s) Oral daily  senna 2 Tablet(s) Oral at bedtime    MEDICATIONS  (PRN):  acetaminophen     Tablet .. 650 milliGRAM(s) Oral every 6 hours PRN Temp greater or equal to 38C (100.4F), Mild Pain (1 - 3)  aluminum hydroxide/magnesium hydroxide/simethicone Suspension 30 milliLiter(s) Oral every 4 hours PRN Dyspepsia  ketorolac   Injectable 30 milliGRAM(s) IV Push every 6 hours PRN Severe Pain (7 - 10)  ketorolac   Injectable 15 milliGRAM(s) IV Push every 6 hours PRN Moderate Pain (4 - 6)  melatonin 3 milliGRAM(s) Oral at bedtime PRN Insomnia  ondansetron Injectable 4 milliGRAM(s) IV Push every 8 hours PRN Nausea and/or Vomiting      CAPILLARY BLOOD GLUCOSE        I&O's Summary      PHYSICAL EXAM:  Vital Signs Last 24 Hrs  T(C): 36.9 (2023 05:02), Max: 36.9 (2023 05:02)  T(F): 98.5 (2023 05:02), Max: 98.5 (2023 05:02)  HR: 64 (2023 05:02) (61 - 91)  BP: 96/57 (2023 05:02) (96/57 - 101/64)  BP(mean): --  RR: 17 (2023 05:02) (17 - 18)  SpO2: 98% (2023 05:02) (94% - 98%)    Parameters below as of 2023 05:02  Patient On (Oxygen Delivery Method): room air    CONSTITUTIONAL: NAD, well-developed, well-groomed  EYES: EOMI; conjunctiva and sclera clear  ENMT: Moist oral mucosa, normal dentition  ABDOMEN: +suprapubic tenderness, no rebound/guarding  PSYCH: A+O to person, place, and time; affect appropriate  NEUROLOGY: No focal deficits   SKIN: No rashes; no palpable lesions    LABS:                        11.3   7.55  )-----------( 201      ( 2023 08:55 )             34.5     04-12    142  |  107  |  9   ----------------------------<  87  4.6   |  28  |  0.63    Ca    8.7      2023 08:55  Phos  3.0     04-12  Mg     2.0     04-12    TPro  6.0  /  Alb  3.6  /  TBili  0.5  /  DBili  x   /  AST  26  /  ALT  19  /  AlkPhos  71  04-11    PT/INR - ( 2023 03:40 )   PT: 16.0 sec;   INR: 1.39 ratio         PTT - ( 2023 03:40 )  PTT:27.7 sec      Urinalysis Basic - ( 2023 04:06 )    Color: Yellow / Appearance: Clear / S.016 / pH: x  Gluc: x / Ketone: Small  / Bili: Negative / Urobili: Negative   Blood: x / Protein: Trace / Nitrite: Negative   Leuk Esterase: Small / RBC: 43 /hpf / WBC 12 /HPF   Sq Epi: x / Non Sq Epi: x / Bacteria: Few        Culture - Urine (collected 2023 04:10)  Source: Clean Catch Clean Catch (Midstream)  Final Report (2023 07:16):    No growth    Culture - Blood (collected 2023 03:30)  Source: .Blood Blood-Peripheral  Preliminary Report (2023 09:02):    No growth to date.    Culture - Blood (collected 2023 03:15)  Source: .Blood Blood-Peripheral  Preliminary Report (2023 09:02):    No growth to date.    Culture - Blood (collected 2023 17:15)  Source: .Blood Blood-Peripheral  Preliminary Report (10 Apr 2023 22:02):    No growth to date.    Culture - Urine (collected 2023 17:00)  Source: Clean Catch Clean Catch (Midstream)  Final Report (2023 17:55):    >100,000 CFU/ml Escherichia coli ESBL  Organism: Escherichia coli ESBL (2023 17:55)  Organism: Escherichia coli ESBL (2023 17:55)    Culture - Blood (collected 2023 17:00)  Source: .Blood Blood-Peripheral  Gram Stain (10 Apr 2023 19:31):    Growth in aerobic bottle: Gram Negative Rods  Final Report (2023 09:17):    Growth in aerobic bottle: Escherichia coli ESBL    ***Blood Panel PCR results on this specimen are available    approximately 3 hours after the Gram stain result.***    Gram stain, PCR, and/or culture results may not always    correspond due to difference in methodologies.    ************************************************************    This PCR assay was performed by multiplex PCR. This    Assay tests for 66 bacterial and resistance gene targets.    Please refer to the [facility] Labs test directory    at [email or web address] for details.  Organism: Blood Culture PCR  Escherichia coli ESBL (2023 09:17)  Organism: Escherichia coli ESBL (2023 09:17)  Organism: Blood Culture PCR (2023 09:17) [facility] Division of Hospital Medicine  Geremias MedhatpablodeeDO  Pager (CIER[facility], 0B-0X): [phone]  Other Times:  [phone]    Patient is a 27y old  Female who presents with a chief complaint of bacteremia 2/2 pyelonephritis (2023 14:52)    SUBJECTIVE / OVERNIGHT EVENTS: No acute events overnight. Patient seen and examined at bedside this morning, endorses hematuria, bilateral flank pain, suprapubic tenderness.    REVIEW OF SYSTEMS:    CONSTITUTIONAL: No further fevers or chills  EYES/ENT: No visual changes;  No vertigo or throat pain   NECK: No pain or stiffness  RESPIRATORY: No cough, wheezing, hemoptysis; No shortness of breath  CARDIOVASCULAR: No chest pain or palpitations  GASTROINTESTINAL: No abdominal or epigastric pain. No nausea, vomiting, or hematemesis; No diarrhea or constipation. No melena or hematochezia.  GENITOURINARY: Endorses hematuria, bilateral flank pain, suprapubic tenderness  NEUROLOGICAL: No numbness or weakness  SKIN: No itching, burning, rashes, or lesions  MSK: No joint pain, no back pain  HEME: No easy bleeding, no easy bruising  All other review of systems is negative unless indicated above.    MEDICATIONS  (STANDING):  ertapenem  IVPB      ertapenem  IVPB 1000 milliGRAM(s) IV Intermittent every 24 hours  lactated ringers. 1000 milliLiter(s) (100 mL/Hr) IV Continuous <Continuous>  polyethylene glycol 3350 17 Gram(s) Oral daily  senna 2 Tablet(s) Oral at bedtime    MEDICATIONS  (PRN):  acetaminophen     Tablet .. 650 milliGRAM(s) Oral every 6 hours PRN Temp greater or equal to 38C (100.4F), Mild Pain (1 - 3)  aluminum hydroxide/magnesium hydroxide/simethicone Suspension 30 milliLiter(s) Oral every 4 hours PRN Dyspepsia  ketorolac   Injectable 30 milliGRAM(s) IV Push every 6 hours PRN Severe Pain (7 - 10)  ketorolac   Injectable 15 milliGRAM(s) IV Push every 6 hours PRN Moderate Pain (4 - 6)  melatonin 3 milliGRAM(s) Oral at bedtime PRN Insomnia  ondansetron Injectable 4 milliGRAM(s) IV Push every 8 hours PRN Nausea and/or Vomiting      CAPILLARY BLOOD GLUCOSE        I&O's Summary      PHYSICAL EXAM:  Vital Signs Last 24 Hrs  T(C): 36.9 (2023 05:02), Max: 36.9 (2023 05:02)  T(F): 98.5 (2023 05:02), Max: 98.5 (2023 05:02)  HR: 64 (2023 05:02) (61 - 91)  BP: 96/57 (2023 05:02) (96/57 - 101/64)  BP(mean): --  RR: 17 (2023 05:02) (17 - 18)  SpO2: 98% (2023 05:02) (94% - 98%)    Parameters below as of 2023 05:02  Patient On (Oxygen Delivery Method): room air    CONSTITUTIONAL: NAD, well-developed, well-groomed  EYES: EOMI; conjunctiva and sclera clear  ENMT: Moist oral mucosa, normal dentition  ABDOMEN: +suprapubic tenderness, no rebound/guarding  PSYCH: A+O to person, place, and time; affect appropriate  NEUROLOGY: No focal deficits   SKIN: No rashes; no palpable lesions    LABS:                        11.3   7.55  )-----------( 201      ( 2023 08:55 )             34.5     04-12    142  |  107  |  9   ----------------------------<  87  4.6   |  28  |  0.63    Ca    8.7      2023 08:55  Phos  3.0     04-12  Mg     2.0     04-12    TPro  6.0  /  Alb  3.6  /  TBili  0.5  /  DBili  x   /  AST  26  /  ALT  19  /  AlkPhos  71  04-11    PT/INR - ( 2023 03:40 )   PT: 16.0 sec;   INR: 1.39 ratio         PTT - ( 2023 03:40 )  PTT:27.7 sec      Urinalysis Basic - ( 2023 04:06 )    Color: Yellow / Appearance: Clear / S.016 / pH: x  Gluc: x / Ketone: Small  / Bili: Negative / Urobili: Negative   Blood: x / Protein: Trace / Nitrite: Negative   Leuk Esterase: Small / RBC: 43 /hpf / WBC 12 /HPF   Sq Epi: x / Non Sq Epi: x / Bacteria: Few        Culture - Urine (collected 2023 04:10)  Source: Clean Catch Clean Catch (Midstream)  Final Report (2023 07:16):    No growth    Culture - Blood (collected 2023 03:30)  Source: .Blood Blood-Peripheral  Preliminary Report (2023 09:02):    No growth to date.    Culture - Blood (collected 2023 03:15)  Source: .Blood Blood-Peripheral  Preliminary Report (2023 09:02):    No growth to date.    Culture - Blood (collected 2023 17:15)  Source: .Blood Blood-Peripheral  Preliminary Report (10 Apr 2023 22:02):    No growth to date.    Culture - Urine (collected 2023 17:00)  Source: Clean Catch Clean Catch (Midstream)  Final Report (2023 17:55):    >100,000 CFU/ml Escherichia coli ESBL  Organism: Escherichia coli ESBL (2023 17:55)  Organism: Escherichia coli ESBL (2023 17:55)    Culture - Blood (collected 2023 17:00)  Source: .Blood Blood-Peripheral  Gram Stain (10 Apr 2023 19:31):    Growth in aerobic bottle: Gram Negative Rods  Final Report (2023 09:17):    Growth in aerobic bottle: Escherichia coli ESBL    ***Blood Panel PCR results on this specimen are available    approximately 3 hours after the Gram stain result.***    Gram stain, PCR, and/or culture results may not always    correspond due to difference in methodologies.    ************************************************************    This PCR assay was performed by multiplex PCR. This    Assay tests for 66 bacterial and resistance gene targets.    Please refer to the [facility] test directory    at [email or web address] for details.  Organism: Blood Culture PCR  Escherichia coli ESBL (2023 09:17)  Organism: Escherichia coli ESBL (2023 09:17)  Organism: Blood Culture PCR (2023 09:17)

## 2023-04-12 NOTE — PROGRESS NOTE ADULT - SUBJECTIVE AND OBJECTIVE BOX
27yPatient is a 27y old  Female who presents with a chief complaint of bacteremia 2/2 pyelonephritis (12 Apr 2023 13:54)      Interval history:  Afebrile, flank pain improved. Starting to feeling better.      Allergies:   Gluten (Unknown)  hickory (Anaphylaxis)  Kiwi (Angioedema)  lactose (Stomach Upset)  No Known Drug Allergies  strawberry (Angioedema)      Antimicrobials:  ertapenem  IVPB      ertapenem  IVPB 1000 milliGRAM(s) IV Intermittent every 24 hours      REVIEW OF SYSTEMS:  No chest pain   No SOB  No abdominal pain  No rash.       Vital Signs Last 24 Hrs  T(C): 36.7 (04-12-23 @ 20:40), Max: 37 (04-12-23 @ 14:28)  T(F): 98.1 (04-12-23 @ 20:40), Max: 98.6 (04-12-23 @ 14:28)  HR: 56 (04-12-23 @ 20:40) (56 - 70)  BP: 113/72 (04-12-23 @ 20:40) (96/57 - 113/72)  BP(mean): --  RR: 18 (04-12-23 @ 20:40) (17 - 18)  SpO2: 98% (04-12-23 @ 20:40) (96% - 98%)      PHYSICAL EXAM:  Pt in no acute distress, alert, awake.   breathing comfortably on RA.   non distended abdomen  no edema LE   no phlebitis                             11.3   7.55  )-----------( 201      ( 12 Apr 2023 08:55 )             34.5   04-12    142  |  107  |  9   ----------------------------<  87  4.6   |  28  |  0.63    Ca    8.7      12 Apr 2023 08:55  Phos  3.0     04-12  Mg     2.0     04-12    TPro  6.0  /  Alb  3.6  /  TBili  0.5  /  DBili  x   /  AST  26  /  ALT  19  /  AlkPhos  71  04-11      LIVER FUNCTIONS - ( 11 Apr 2023 03:40 )  Alb: 3.6 g/dL / Pro: 6.0 g/dL / ALK PHOS: 71 U/L / ALT: 19 U/L / AST: 26 U/L / GGT: x               Culture - Urine (collected 11 Apr 2023 04:10)  Source: Clean Catch Clean Catch (Midstream)  Final Report (12 Apr 2023 07:16):    No growth    Culture - Blood (collected 11 Apr 2023 03:30)  Source: .Blood Blood-Peripheral  Preliminary Report (12 Apr 2023 09:02):    No growth to date.    Culture - Blood (collected 11 Apr 2023 03:15)  Source: .Blood Blood-Peripheral  Preliminary Report (12 Apr 2023 09:02):    No growth to date.        Radiology:  < from: US Kidney and Bladder (04.11.23 @ 19:00) >    IMPRESSION:  No discrete fluid collection or abscess is visualized. No hydronephrosis.  Nonobstructing 0.3 cm left renal calculus.

## 2023-04-12 NOTE — PROGRESS NOTE ADULT - PROBLEM SELECTOR PLAN 1
Bacteremia 2/2 pyelonephritis, CT 4/9 showed R sided pyelo and ureteritis, patient reports that pain has now also spread to L flank  -Urine and Blood cultures from 4/9 showed ESBL E coli, pt was discharged on cefpodoxime (cultures not back at that time)  -s/p zosyn in the ED, given ESBL changed abx to Ertapenem  -repeat blood and urine cultures pending  -ID consulted  -Renal sono to look for pyelo on L side given progression of symptoms and look for abscess formation Bacteremia 2/2 pyelonephritis, CT 4/9 showed R sided pyelo and ureteritis, patient reports that pain has now also spread to L flank  -Urine and Blood cultures from 4/9 showed ESBL E coli  -ID consulted, continue Ertapenem  -repeat blood and urine cultures NGTD  -Renal sono without abscess  -Will need midline to complete course of Ertapenem

## 2023-04-13 LAB
ALBUMIN SERPL ELPH-MCNC: 3.4 G/DL — SIGNIFICANT CHANGE UP (ref 3.3–5)
ALP SERPL-CCNC: 86 U/L — SIGNIFICANT CHANGE UP (ref 40–120)
ALT FLD-CCNC: 19 U/L — SIGNIFICANT CHANGE UP (ref 10–45)
ANION GAP SERPL CALC-SCNC: 8 MMOL/L — SIGNIFICANT CHANGE UP (ref 5–17)
AST SERPL-CCNC: 24 U/L — SIGNIFICANT CHANGE UP (ref 10–40)
BILIRUB SERPL-MCNC: 0.2 MG/DL — SIGNIFICANT CHANGE UP (ref 0.2–1.2)
BUN SERPL-MCNC: 7 MG/DL — SIGNIFICANT CHANGE UP (ref 7–23)
C TRACH RRNA SPEC QL NAA+PROBE: SIGNIFICANT CHANGE UP
CALCIUM SERPL-MCNC: 9.2 MG/DL — SIGNIFICANT CHANGE UP (ref 8.4–10.5)
CHLORIDE SERPL-SCNC: 105 MMOL/L — SIGNIFICANT CHANGE UP (ref 96–108)
CO2 SERPL-SCNC: 26 MMOL/L — SIGNIFICANT CHANGE UP (ref 22–31)
CREAT SERPL-MCNC: 0.62 MG/DL — SIGNIFICANT CHANGE UP (ref 0.5–1.3)
EGFR: 125 ML/MIN/1.73M2 — SIGNIFICANT CHANGE UP
GLUCOSE SERPL-MCNC: 96 MG/DL — SIGNIFICANT CHANGE UP (ref 70–99)
HCT VFR BLD CALC: 36.9 % — SIGNIFICANT CHANGE UP (ref 34.5–45)
HGB BLD-MCNC: 12 G/DL — SIGNIFICANT CHANGE UP (ref 11.5–15.5)
MAGNESIUM SERPL-MCNC: 2.2 MG/DL — SIGNIFICANT CHANGE UP (ref 1.6–2.6)
MCHC RBC-ENTMCNC: 31.3 PG — SIGNIFICANT CHANGE UP (ref 27–34)
MCHC RBC-ENTMCNC: 32.5 GM/DL — SIGNIFICANT CHANGE UP (ref 32–36)
MCV RBC AUTO: 96.1 FL — SIGNIFICANT CHANGE UP (ref 80–100)
N GONORRHOEA RRNA SPEC QL NAA+PROBE: SIGNIFICANT CHANGE UP
NRBC # BLD: 0 /100 WBCS — SIGNIFICANT CHANGE UP (ref 0–0)
PHOSPHATE SERPL-MCNC: 3.7 MG/DL — SIGNIFICANT CHANGE UP (ref 2.5–4.5)
PLATELET # BLD AUTO: 247 K/UL — SIGNIFICANT CHANGE UP (ref 150–400)
POTASSIUM SERPL-MCNC: 4.4 MMOL/L — SIGNIFICANT CHANGE UP (ref 3.5–5.3)
POTASSIUM SERPL-SCNC: 4.4 MMOL/L — SIGNIFICANT CHANGE UP (ref 3.5–5.3)
PROT SERPL-MCNC: 6 G/DL — SIGNIFICANT CHANGE UP (ref 6–8.3)
RBC # BLD: 3.84 M/UL — SIGNIFICANT CHANGE UP (ref 3.8–5.2)
RBC # FLD: 13.8 % — SIGNIFICANT CHANGE UP (ref 10.3–14.5)
SODIUM SERPL-SCNC: 139 MMOL/L — SIGNIFICANT CHANGE UP (ref 135–145)
WBC # BLD: 7.5 K/UL — SIGNIFICANT CHANGE UP (ref 3.8–10.5)
WBC # FLD AUTO: 7.5 K/UL — SIGNIFICANT CHANGE UP (ref 3.8–10.5)

## 2023-04-13 PROCEDURE — 99232 SBSQ HOSP IP/OBS MODERATE 35: CPT

## 2023-04-13 RX ORDER — ERTAPENEM SODIUM 1 G/1
1 INJECTION, POWDER, LYOPHILIZED, FOR SOLUTION INTRAMUSCULAR; INTRAVENOUS
Qty: 7 | Refills: 0
Start: 2023-04-13 | End: 2023-04-19

## 2023-04-13 RX ADMIN — POLYETHYLENE GLYCOL 3350 17 GRAM(S): 17 POWDER, FOR SOLUTION ORAL at 11:03

## 2023-04-13 RX ADMIN — Medication 650 MILLIGRAM(S): at 14:23

## 2023-04-13 RX ADMIN — Medication 15 MILLIGRAM(S): at 04:15

## 2023-04-13 RX ADMIN — Medication 650 MILLIGRAM(S): at 13:30

## 2023-04-13 RX ADMIN — Medication 15 MILLIGRAM(S): at 04:01

## 2023-04-13 RX ADMIN — Medication 650 MILLIGRAM(S): at 23:44

## 2023-04-13 RX ADMIN — SENNA PLUS 2 TABLET(S): 8.6 TABLET ORAL at 22:41

## 2023-04-13 RX ADMIN — ERTAPENEM SODIUM 120 MILLIGRAM(S): 1 INJECTION, POWDER, LYOPHILIZED, FOR SOLUTION INTRAMUSCULAR; INTRAVENOUS at 11:02

## 2023-04-13 RX ADMIN — Medication 650 MILLIGRAM(S): at 22:44

## 2023-04-13 NOTE — PROGRESS NOTE ADULT - SUBJECTIVE AND OBJECTIVE BOX
27yPatient is a 27y old  Female who presents with a chief complaint of bacteremia 2/2 pyelonephritis (13 Apr 2023 18:11)      Interval history:  Afebrile, feeling much better. Flank pain improved significantly.       Allergies:   Gluten (Unknown)  hickory (Anaphylaxis)  Kiwi (Angioedema)  lactose (Stomach Upset)  No Known Drug Allergies  strawberry (Angioedema)      Antimicrobials:  ertapenem  IVPB      ertapenem  IVPB 1000 milliGRAM(s) IV Intermittent every 24 hours      REVIEW OF SYSTEMS:  No chest pain   No SOB  No abdominal pain  No rash.       Vital Signs Last 24 Hrs  T(C): 36.7 (04-13-23 @ 14:31), Max: 37.5 (04-13-23 @ 04:32)  T(F): 98 (04-13-23 @ 14:31), Max: 99.5 (04-13-23 @ 04:32)  HR: 48 (04-13-23 @ 14:31) (48 - 61)  BP: 128/78 (04-13-23 @ 14:31) (102/59 - 128/78)  BP(mean): --  RR: 18 (04-13-23 @ 14:31) (18 - 18)  SpO2: 98% (04-13-23 @ 14:31) (97% - 98%)    PHYSICAL EXAM:  Pt in no acute distress, alert, awake.   No icterus  non distended abdomen  no edema LE   no phlebitis                             12.0   7.50  )-----------( 247      ( 13 Apr 2023 07:17 )             36.9   04-13    139  |  105  |  7   ----------------------------<  96  4.4   |  26  |  0.62    Ca    9.2      13 Apr 2023 07:17  Phos  3.7     04-13  Mg     2.2     04-13    TPro  6.0  /  Alb  3.4  /  TBili  0.2  /  DBili  x   /  AST  24  /  ALT  19  /  AlkPhos  86  04-13      LIVER FUNCTIONS - ( 13 Apr 2023 07:17 )  Alb: 3.4 g/dL / Pro: 6.0 g/dL / ALK PHOS: 86 U/L / ALT: 19 U/L / AST: 24 U/L / GGT: x               Culture - Urine (collected 11 Apr 2023 04:10)  Source: Clean Catch Clean Catch (Midstream)  Final Report (12 Apr 2023 07:16):    No growth    Culture - Blood (collected 11 Apr 2023 03:30)  Source: .Blood Blood-Peripheral  Preliminary Report (12 Apr 2023 09:02):    No growth to date.    Culture - Blood (collected 11 Apr 2023 03:15)  Source: .Blood Blood-Peripheral  Preliminary Report (12 Apr 2023 09:02):    No growth to date.

## 2023-04-13 NOTE — DISCHARGE NOTE PROVIDER - HOSPITAL COURSE
27F w/ PMHx SLE (not on meds) admitted for pyelonephritis and ESBL E coli bacteremia.       Problem/Plan - 1:  ·  Problem: E coli bacteremia.   ·  Plan: Bacteremia 2/2 pyelonephritis, CT 4/9 showed R sided pyelo and ureteritis  -Urine and Blood cultures from 4/9 showed ESBL E coli  -ID consulted, continue Ertapenem  -repeat blood and urine cultures NGTD  -Renal sono without abscess  - S/p midline to complete course of Ertapenem.    Problem/Plan - 2:  ·  Problem: Pyelonephritis.   ·  Plan: -see above   -pain control with toradol.    Problem/Plan - 3:  ·  Problem: Systemic lupus erythematosus.   ·  Plan: -Not on any medications, received IV[facility] infusions w/ [facility] and has a Rheumatologist, last infusion was 3 months ago.    Problem/Plan - 4:  ·  Problem: Need for prophylactic measure.   ·  Plan: Diet: Regular  DVT: low risk, encourage ambulation  Dispo: S/P midline and f/u CM abx set up. 27F w/ PMHx SLE (not on meds) admitted for pyelonephritis and ESBL E coli bacteremia.       Problem/Plan - 1:  ·  Problem: E coli bacteremia.   ·  Plan: Bacteremia 2/2 pyelonephritis, CT 4/9 showed R sided pyelo and ureteritis  -Urine and Blood cultures from 4/9 showed ESBL E coli  -ID consulted, continue Ertapenem  -repeat blood and urine cultures NGTD  -Renal sono without abscess  - S/p midline to complete course of Ertapenem.    Problem/Plan - 2:  ·  Problem: Pyelonephritis.   ·  Plan: -see above   -pain control with toradol.    Problem/Plan - 3:  ·  Problem: Systemic lupus erythematosus.   ·  Plan: -Not on any medications, received IVIG infusions w/ [facility] and has a Rheumatologist, last infusion was 3 months ago.    Problem/Plan - 4:  ·  Problem: Need for prophylactic measure.   ·  Plan: Diet: Regular  DVT: low risk, encourage ambulation  Dispo: S/P midline and S/P AAbx dose for today. Stable to [place] home d/w Dr. Olivo   27F w/ PMHx SLE (not on meds) admitted for pyelonephritis and ESBL E coli bacteremia.       Problem/Plan - 1:  ·  Problem: E coli bacteremia.   ·  Plan: Bacteremia 2/2 pyelonephritis, CT 4/9 showed R sided pyelo and ureteritis  -Urine and Blood cultures from 4/9 showed ESBL E coli  -ID consulted, continue Ertapenem  -repeat blood and urine cultures NGTD  -Renal sono without abscess  - S/p midline to complete course of Ertapenem.    Problem/Plan - 2:  ·  Problem: Pyelonephritis.   ·  Plan: -see above   -pain control with toradol.    Problem/Plan - 3:  ·  Problem: Systemic lupus erythematosus.   ·  Plan: -Not on any medications, received IVIG infusions w/ [facility] and has a Rheumatologist, last infusion was 3 months ago.    Problem/Plan - 4:  ·  Problem: Need for prophylactic measure.   ·  Plan: Diet: Regular  DVT: low risk, encourage ambulation  Dispo: S/P midline and S/P AAbx dose for today. Stable to [facility] d/[place] Dr. Olivo   27F w/ PMHx SLE (not on meds) admitted for pyelonephritis and ESBL E coli bacteremia.       Problem/Plan - 1:  ·  Problem: E coli bacteremia.   ·  Plan: Bacteremia 2/2 pyelonephritis, CT 4/9 showed R sided pyelo and ureteritis  -Urine and Blood cultures from 4/9 showed ESBL E coli  -ID consulted, continue Ertapenem  -repeat blood and urine cultures NGTD  -Renal sono without abscess  - S/p midline to complete course of Ertapenem.    Problem/Plan - 2:  ·  Problem: Pyelonephritis.   ·  Plan: -see above   -pain control with toradol.    Problem/Plan - 3:  ·  Problem: Systemic lupus erythematosus.   ·  Plan: -Not on any medications, received IVIG infusions w/ [facility] and has a Rheumatologist, last infusion was 3 months ago.    Problem/Plan - 4:  ·  Problem: Need for prophylactic measure.   ·  Plan: Diet: Regular  DVT: low risk, encourage ambulation  Dispo: S/P midline and S/P AAbx dose for today. Stable to [place] home d/w Dr. Olivo   27F w/ PMHx SLE (not on meds) admitted for pyelonephritis and ESBL E coli bacteremia.       Problem/Plan - 1:  ·  Problem: E coli bacteremia.   ·  Plan: Bacteremia 2/2 pyelonephritis, CT 4/9 showed R sided pyelo and ureteritis  -Urine and Blood cultures from 4/9 showed ESBL E coli  -ID consulted, continue Ertapenem  -repeat blood and urine cultures NGTD  -Renal sono without abscess  - S/p midline to complete course of Ertapenem.    Problem/Plan - 2:  ·  Problem: Pyelonephritis.   ·  Plan: -see above   -pain control with toradol.    Problem/Plan - 3:  ·  Problem: Systemic lupus erythematosus.   ·  Plan: -Not on any medications, received IVIG infusions w/ [facility] and has a Rheumatologist, last infusion was 3 months ago.    Problem/Plan - 4:  ·  Problem: Need for prophylactic measure.   ·  Plan: Diet: Regular  DVT: low risk, encourage ambulation  Dispo: S/P midline and S/P AAbx dose for today. Stable to [place] home d/w Dr. Olivo    Discharge Diagnoses    #ESBL E. Coli bacteremia  #Pyelonephritis  #Systemic lupus erythematosus 27F w/ PMHx SLE (not on meds) admitted for pyelonephritis and ESBL E coli bacteremia.       Problem/Plan - 1:  ·  Problem: E coli bacteremia.   ·  Plan: Bacteremia 2/2 pyelonephritis, CT 4/9 showed R sided pyelo and ureteritis  -Urine and Blood cultures from 4/9 showed ESBL E coli  -ID consulted, continue Ertapenem  -repeat blood and urine cultures NGTD  -Renal sono without abscess  - S/p midline to complete course of Ertapenem.    Problem/Plan - 2:  ·  Problem: Pyelonephritis.   ·  Plan: -see above   -pain control with toradol.    Problem/Plan - 3:  ·  Problem: Systemic lupus erythematosus.   ·  Plan: -Not on any medications, received IVIG infusions w/ [facility] and has a Rheumatologist, last infusion was 3 months ago.    Problem/Plan - 4:  ·  Problem: Need for prophylactic measure.   ·  Plan: Diet: Regular  DVT: low risk, encourage ambulation  Dispo: S/P midline and S/P AAbx dose for today. Stable to [place] home d/w Dr. Olivo    Discharge Diagnoses    #ESBL E. Coli bacteremia  #Pyelonephritis  #Systemic lupus erythematosus 27F w/ PMHx SLE (not on meds) admitted for pyelonephritis and ESBL E coli bacteremia.       Problem/Plan - 1:  ·  Problem: E coli bacteremia.   ·  Plan: Bacteremia 2/2 pyelonephritis, CT 4/9 showed R sided pyelo and ureteritis  -Urine and Blood cultures from 4/9 showed ESBL E coli  -ID consulted, continue Ertapenem  -repeat blood and urine cultures NGTD  -Renal sono without abscess  - S/p midline to complete course of Ertapenem.    Problem/Plan - 2:  ·  Problem: Pyelonephritis.   ·  Plan: -see above   -pain control with toradol.    Problem/Plan - 3:  ·  Problem: Systemic lupus erythematosus.   ·  Plan: -Not on any medications, received IVIG infusions w/ [facility] and has a Rheumatologist, last infusion was 3 months ago.    Problem/Plan - 4:  ·  Problem: Need for prophylactic measure.   ·  Plan: Diet: Regular  DVT: low risk, encourage ambulation  Dispo: S/P midline and S/P AAbx dose for today. Stable to [place] home d/w Dr. Olivo    Discharge Diagnoses    #ESBL E. Coli bacteremia  #Pyelonephritis  #Systemic lupus erythematosus

## 2023-04-13 NOTE — PROGRESS NOTE ADULT - PROBLEM SELECTOR PLAN 1
Bacteremia 2/2 pyelonephritis, CT 4/9 showed R sided pyelo and ureteritis  -Urine and Blood cultures from 4/9 showed ESBL E coli  -ID consulted, continue Ertapenem  -repeat blood and urine cultures NGTD  -Renal sono without abscess  -Will need midline to complete course of Ertapenem

## 2023-04-13 NOTE — PROGRESS NOTE ADULT - SUBJECTIVE AND OBJECTIVE BOX
[facility] Division of Hospital Medicine  Geremias Olivo DO  Pager (CIERRA, 8A-5P): [phone]  Other Times:  [phone]    Patient is a 27y old  Female who presents with a chief complaint of bacteremia 2/2 pyelonephritis (12 Apr 2023 14:46)    SUBJECTIVE / OVERNIGHT EVENTS: No acute events overnight. Patient seen and examined at bedside this morning, still has R sided flank pain and suprapubic tenderness but improved from yesterday. Denies nausea and vomiting.    REVIEW OF SYSTEMS:    CONSTITUTIONAL: No weakness, fevers or chills  EYES/ENT: No visual changes;  No vertigo or throat pain   NECK: No pain or stiffness  RESPIRATORY: No cough, wheezing, hemoptysis; No shortness of breath  CARDIOVASCULAR: No chest pain or palpitations  GASTROINTESTINAL: No abdominal or epigastric pain. No nausea, vomiting, or hematemesis; No diarrhea or constipation. No melena or hematochezia.  GENITOURINARY: Endorses R sided flank pain, suprapubic tenderness  NEUROLOGICAL: No numbness or weakness  SKIN: No itching, burning, rashes, or lesions  MSK: No joint pain, no back pain  HEME: No easy bleeding, no easy bruising  All other review of systems is negative unless indicated above.    MEDICATIONS  (STANDING):  ertapenem  IVPB      ertapenem  IVPB 1000 milliGRAM(s) IV Intermittent every 24 hours  polyethylene glycol 3350 17 Gram(s) Oral daily  senna 2 Tablet(s) Oral at bedtime    MEDICATIONS  (PRN):  acetaminophen     Tablet .. 650 milliGRAM(s) Oral every 6 hours PRN Temp greater or equal to 38C (100.4F), Mild Pain (1 - 3)  aluminum hydroxide/magnesium hydroxide/simethicone Suspension 30 milliLiter(s) Oral every 4 hours PRN Dyspepsia  ketorolac   Injectable 30 milliGRAM(s) IV Push every 6 hours PRN Severe Pain (7 - 10)  ketorolac   Injectable 15 milliGRAM(s) IV Push every 6 hours PRN Moderate Pain (4 - 6)  melatonin 3 milliGRAM(s) Oral at bedtime PRN Insomnia  ondansetron Injectable 4 milliGRAM(s) IV Push every 8 hours PRN Nausea and/or Vomiting      CAPILLARY BLOOD GLUCOSE        I&O's Summary    12 Apr 2023 07:01  -  13 Apr 2023 07:00  --------------------------------------------------------  IN: 810 mL / OUT: 400 mL / NET: 410 mL        PHYSICAL EXAM:  Vital Signs Last 24 Hrs  T(C): 36.7 (13 Apr 2023 14:31), Max: 37.5 (13 Apr 2023 04:32)  T(F): 98 (13 Apr 2023 14:31), Max: 99.5 (13 Apr 2023 04:32)  HR: 48 (13 Apr 2023 14:31) (48 - 61)  BP: 128/78 (13 Apr 2023 14:31) (102/59 - 128/78)  BP(mean): --  RR: 18 (13 Apr 2023 14:31) (18 - 18)  SpO2: 98% (13 Apr 2023 14:31) (97% - 98%)    Parameters below as of 13 Apr 2023 14:31  Patient On (Oxygen Delivery Method): room air    CONSTITUTIONAL: NAD, well-developed, well-groomed  EYES: EOMI; conjunctiva and sclera clear  ENMT: Moist oral mucosa, normal dentition  ABDOMEN: +suprapubic tenderness, no rebound/guarding  PSYCH: A+O to person, place, and time; affect appropriate  NEUROLOGY: No focal deficits   SKIN: No rashes; no palpable lesions    LABS:                        12.0   7.50  )-----------( 247      ( 13 Apr 2023 07:17 )             36.9     04-13    139  |  105  |  7   ----------------------------<  96  4.4   |  26  |  0.62    Ca    9.2      13 Apr 2023 07:17  Phos  3.7     04-13  Mg     2.2     04-13    TPro  6.0  /  Alb  3.4  /  TBili  0.2  /  DBili  x   /  AST  24  /  ALT  19  /  AlkPhos  86  04-13              Culture - Urine (collected 11 Apr 2023 04:10)  Source: Clean Catch Clean Catch (Midstream)  Final Report (12 Apr 2023 07:16):    No growth    Culture - Blood (collected 11 Apr 2023 03:30)  Source: .Blood Blood-Peripheral  Preliminary Report (12 Apr 2023 09:02):    No growth to date.    Culture - Blood (collected 11 Apr 2023 03:15)  Source: .Blood Blood-Peripheral  Preliminary Report (12 Apr 2023 09:02):    No growth to date.     [facility] Division of Hospital Medicine  Geremias Olivo DO  Pager ([facility], 8A-5P): [phone]  Other Times:  [phone]    Patient is a 27y old  Female who presents with a chief complaint of bacteremia 2/2 pyelonephritis (12 Apr 2023 14:46)    SUBJECTIVE / OVERNIGHT EVENTS: No acute events overnight. Patient seen and examined at bedside this morning, still has R sided flank pain and suprapubic tenderness but improved from yesterday. Denies nausea and vomiting.    REVIEW OF SYSTEMS:    CONSTITUTIONAL: No weakness, fevers or chills  EYES/ENT: No visual changes;  No vertigo or throat pain   NECK: No pain or stiffness  RESPIRATORY: No cough, wheezing, hemoptysis; No shortness of breath  CARDIOVASCULAR: No chest pain or palpitations  GASTROINTESTINAL: No abdominal or epigastric pain. No nausea, vomiting, or hematemesis; No diarrhea or constipation. No melena or hematochezia.  GENITOURINARY: Endorses R sided flank pain, suprapubic tenderness  NEUROLOGICAL: No numbness or weakness  SKIN: No itching, burning, rashes, or lesions  MSK: No joint pain, no back pain  HEME: No easy bleeding, no easy bruising  All other review of systems is negative unless indicated above.    MEDICATIONS  (STANDING):  ertapenem  IVPB      ertapenem  IVPB 1000 milliGRAM(s) IV Intermittent every 24 hours  polyethylene glycol 3350 17 Gram(s) Oral daily  senna 2 Tablet(s) Oral at bedtime    MEDICATIONS  (PRN):  acetaminophen     Tablet .. 650 milliGRAM(s) Oral every 6 hours PRN Temp greater or equal to 38C (100.4F), Mild Pain (1 - 3)  aluminum hydroxide/magnesium hydroxide/simethicone Suspension 30 milliLiter(s) Oral every 4 hours PRN Dyspepsia  ketorolac   Injectable 30 milliGRAM(s) IV Push every 6 hours PRN Severe Pain (7 - 10)  ketorolac   Injectable 15 milliGRAM(s) IV Push every 6 hours PRN Moderate Pain (4 - 6)  melatonin 3 milliGRAM(s) Oral at bedtime PRN Insomnia  ondansetron Injectable 4 milliGRAM(s) IV Push every 8 hours PRN Nausea and/or Vomiting      CAPILLARY BLOOD GLUCOSE        I&O's Summary    12 Apr 2023 07:01  -  13 Apr 2023 07:00  --------------------------------------------------------  IN: 810 mL / OUT: 400 mL / NET: 410 mL        PHYSICAL EXAM:  Vital Signs Last 24 Hrs  T(C): 36.7 (13 Apr 2023 14:31), Max: 37.5 (13 Apr 2023 04:32)  T(F): 98 (13 Apr 2023 14:31), Max: 99.5 (13 Apr 2023 04:32)  HR: 48 (13 Apr 2023 14:31) (48 - 61)  BP: 128/78 (13 Apr 2023 14:31) (102/59 - 128/78)  BP(mean): --  RR: 18 (13 Apr 2023 14:31) (18 - 18)  SpO2: 98% (13 Apr 2023 14:31) (97% - 98%)    Parameters below as of 13 Apr 2023 14:31  Patient On (Oxygen Delivery Method): room air    CONSTITUTIONAL: NAD, well-developed, well-groomed  EYES: EOMI; conjunctiva and sclera clear  ENMT: Moist oral mucosa, normal dentition  ABDOMEN: +suprapubic tenderness, no rebound/guarding  PSYCH: A+O to person, place, and time; affect appropriate  NEUROLOGY: No focal deficits   SKIN: No rashes; no palpable lesions    LABS:                        12.0   7.50  )-----------( 247      ( 13 Apr 2023 07:17 )             36.9     04-13    139  |  105  |  7   ----------------------------<  96  4.4   |  26  |  0.62    Ca    9.2      13 Apr 2023 07:17  Phos  3.7     04-13  Mg     2.2     04-13    TPro  6.0  /  Alb  3.4  /  TBili  0.2  /  DBili  x   /  AST  24  /  ALT  19  /  AlkPhos  86  04-13              Culture - Urine (collected 11 Apr 2023 04:10)  Source: Clean Catch Clean Catch (Midstream)  Final Report (12 Apr 2023 07:16):    No growth    Culture - Blood (collected 11 Apr 2023 03:30)  Source: .Blood Blood-Peripheral  Preliminary Report (12 Apr 2023 09:02):    No growth to date.    Culture - Blood (collected 11 Apr 2023 03:15)  Source: .Blood Blood-Peripheral  Preliminary Report (12 Apr 2023 09:02):    No growth to date.     [facility] Division of Hospital Medicine  Geremias Olivo DO  Pager ([facility], 8A-5P): [phone]  Other Times:  [phone]    Patient is a 27y old  Female who presents with a chief complaint of bacteremia 2/2 pyelonephritis (12 Apr 2023 14:46)    SUBJECTIVE / OVERNIGHT EVENTS: No acute events overnight. Patient seen and examined at bedside this morning, still has R sided flank pain and suprapubic tenderness but improved from yesterday. Denies nausea and vomiting.    REVIEW OF SYSTEMS:    CONSTITUTIONAL: No weakness, fevers or chills  EYES/ENT: No visual changes;  No vertigo or throat pain   NECK: No pain or stiffness  RESPIRATORY: No cough, wheezing, hemoptysis; No shortness of breath  CARDIOVASCULAR: No chest pain or palpitations  GASTROINTESTINAL: No abdominal or epigastric pain. No nausea, vomiting, or hematemesis; No diarrhea or constipation. No melena or hematochezia.  GENITOURINARY: Endorses R sided flank pain, suprapubic tenderness  NEUROLOGICAL: No numbness or weakness  SKIN: No itching, burning, rashes, or lesions  MSK: No joint pain, no back pain  HEME: No easy bleeding, no easy bruising  All other review of systems is negative unless indicated above.    MEDICATIONS  (STANDING):  ertapenem  IVPB      ertapenem  IVPB 1000 milliGRAM(s) IV Intermittent every 24 hours  polyethylene glycol 3350 17 Gram(s) Oral daily  senna 2 Tablet(s) Oral at bedtime    MEDICATIONS  (PRN):  acetaminophen     Tablet .. 650 milliGRAM(s) Oral every 6 hours PRN Temp greater or equal to 38C (100.4F), Mild Pain (1 - 3)  aluminum hydroxide/magnesium hydroxide/simethicone Suspension 30 milliLiter(s) Oral every 4 hours PRN Dyspepsia  ketorolac   Injectable 30 milliGRAM(s) IV Push every 6 hours PRN Severe Pain (7 - 10)  ketorolac   Injectable 15 milliGRAM(s) IV Push every 6 hours PRN Moderate Pain (4 - 6)  melatonin 3 milliGRAM(s) Oral at bedtime PRN Insomnia  ondansetron Injectable 4 milliGRAM(s) IV Push every 8 hours PRN Nausea and/or Vomiting      CAPILLARY BLOOD GLUCOSE        I&O's Summary    12 Apr 2023 07:01  -  13 Apr 2023 07:00  --------------------------------------------------------  IN: 810 mL / OUT: 400 mL / NET: 410 mL        PHYSICAL EXAM:  Vital Signs Last 24 Hrs  T(C): 36.7 (13 Apr 2023 14:31), Max: 37.5 (13 Apr 2023 04:32)  T(F): 98 (13 Apr 2023 14:31), Max: 99.5 (13 Apr 2023 04:32)  HR: 48 (13 Apr 2023 14:31) (48 - 61)  BP: 128/78 (13 Apr 2023 14:31) (102/59 - 128/78)  BP(mean): --  RR: 18 (13 Apr 2023 14:31) (18 - 18)  SpO2: 98% (13 Apr 2023 14:31) (97% - 98%)    Parameters below as of 13 Apr 2023 14:31  Patient On (Oxygen Delivery Method): room air    CONSTITUTIONAL: NAD, well-developed, well-groomed  EYES: EOMI; conjunctiva and sclera clear  ENMT: Moist oral mucosa, normal dentition  ABDOMEN: +suprapubic tenderness, no rebound/guarding  PSYCH: A+O to person, place, and time; affect appropriate  NEUROLOGY: No focal deficits   SKIN: No rashes; no palpable lesions    LABS:                        12.0   7.50  )-----------( 247      ( 13 Apr 2023 07:17 )             36.9     04-13    139  |  105  |  7   ----------------------------<  96  4.4   |  26  |  0.62    Ca    9.2      13 Apr 2023 07:17  Phos  3.7     04-13  Mg     2.2     04-13    TPro  6.0  /  Alb  3.4  /  TBili  0.2  /  DBili  x   /  AST  24  /  ALT  19  /  AlkPhos  86  04-13              Culture - Urine (collected 11 Apr 2023 04:10)  Source: Clean Catch Clean Catch (Midstream)  Final Report (12 Apr 2023 07:16):    No growth    Culture - Blood (collected 11 Apr 2023 03:30)  Source: .Blood Blood-Peripheral  Preliminary Report (12 Apr 2023 09:02):    No growth to date.    Culture - Blood (collected 11 Apr 2023 03:15)  Source: .Blood Blood-Peripheral  Preliminary Report (12 Apr 2023 09:02):    No growth to date.

## 2023-04-13 NOTE — DISCHARGE NOTE PROVIDER - NSDCCPTREATMENT_GEN_ALL_CORE_FT
PRINCIPAL PROCEDURE  Procedure: Abdomen CT  Findings and Treatment: INTERPRETATION:  CLINICAL INFORMATION: Right abdominal pain  COMPARISON: None.  CONTRAST/COMPLICATIONS:  IV Contrast: Omnipaque 350  90 cc administered   10 cc discarded  Oral Contrast: Omnipaque 300  Complications: None reported at time of study completion  PROCEDURE:  CT of the Abdomen and Pelvis was performed.  Sagittal and coronal reformats were performed.  FINDINGS:  LOWER CHEST: Bilateral breast implants. Lower lobe atelectasis.  LIVER: Within normal limits.  BILE DUCTS: Normal caliber.  GALLBLADDER: Within normal limits.  SPLEEN: Within normal limits.  PANCREAS: Within normal limits.  ADRENALS: Within normal limits.  KIDNEYS/URETERS: Heterogeneous right renal enhancement. Prominent right   ureteral enhancement. Nonobstructive left renal calculi. Subcentimeter   right renal hypodensity, too small to further characterize.  BLADDER: Within normal limits.  REPRODUCTIVE ORGANS: Uterus and adnexa within normal limits. Intrauterine   device. Bartholin's gland cysts.  BOWEL: No bowel obstruction. Appendix is not visualized. No evidence of   inflammation in the pericecal region.  PERITONEUM: No ascites.  VESSELS: Within normal limits.  RETROPERITONEUM/LYMPH NODES: No lymphadenopathy.  ABDOMINAL WALL: Within normal limits.  BONES: Within normal limits.  IMPRESSION:  Acute right pyelonephritis and ureteritis.  --- End of Report ---

## 2023-04-13 NOTE — DISCHARGE NOTE PROVIDER - NSDCCAREPROVSEEN_GEN_ALL_CORE_FT
Pallavi, Geremias Kidd, Garett Montero, Albert Martinez, Slade Dee, Tanya Calle, Stefan Alicia, Angelina

## 2023-04-13 NOTE — DISCHARGE NOTE PROVIDER - NSDCCPCAREPLAN_GEN_ALL_CORE_FT
PRINCIPAL DISCHARGE DIAGNOSIS  Diagnosis: Pyelonephritis  Assessment and Plan of Treatment: Bacteremia 2/2 pyelonephritis, CT 4/9 showed R sided pyelo and ureteritis  -Urine and Blood cultures from 4/9 showed ESBL E coli  -ID consulted, continue Ertapenem as directed  -repeat blood and urine cultures NGTD  -Renal sono without abscess        SECONDARY DISCHARGE DIAGNOSES  Diagnosis: Positive blood culture  Assessment and Plan of Treatment:      PRINCIPAL DISCHARGE DIAGNOSIS  Diagnosis: Pyelonephritis  Assessment and Plan of Treatment: Bacteremia 2/2 pyelonephritis, CT 4/9 showed R sided pyelo and ureteritis  -Urine and Blood cultures from 4/9 showed ESBL E coli  -ID consulted, continue Ertapenem till 4/21  -repeat blood and urine cultures NGTD  -Renal sono without abscess        SECONDARY DISCHARGE DIAGNOSES  Diagnosis: Positive blood culture  Assessment and Plan of Treatment:      PRINCIPAL DISCHARGE DIAGNOSIS  Diagnosis: Pyelonephritis  Assessment and Plan of Treatment: You came to the hospital due to a urinary tract infection that spread to your blood stream with ESBL E. Coli. CT scan showed R sided pyelonephritis and ureteritis.  -Please continue to take Ertapenem 1g daily through 4/21 per the recommendations of the infectious disease team

## 2023-04-13 NOTE — DISCHARGE NOTE PROVIDER - PROVIDER TOKENS
FREE:[LAST:[PMD],PHONE:[(   )    -],FAX:[(   )    -],FOLLOWUP:[1 week]] PROVIDER:[TOKEN:[[id number]],FOLLOWUP:[1 week],ESTABLISHEDPATIENT:[T]] PROVIDER:[TOKEN:[[id number]],FOLLOWUP:[1 week],ESTABLISHEDPATIENT:[T]] PROVIDER:[TOKEN:[[id number]],FOLLOWUP:[1 week],ESTABLISHEDPATIENT:[T]]

## 2023-04-13 NOTE — PROGRESS NOTE ADULT - ASSESSMENT
26 y/o F PMHx SLE (receiving IVIG, no other active meds), initially presented on 4/9 with R sided flank/back pain. Found to have R pyelonephritis. Now called back for admission as blood culture growing ESBL e.coli.     Afebrile, HD stable  WBC 14K today  Urine Culture (4/9):  e.coli  Blood Cultures (4/9):  e.coli 1 bottle, CTX-M detected  CT Abd/Pelv (4/9):  acute R pyelonephritis and ureteritis      Impression:  #ESBL E.Coli Bacteremia   #Pyelonephritis  #Fever, Leukocytosis, resolved     clinically significantly improved.       Recs:  - continue with ertapenem 1g IV q24H  - repeat blood cultures NTD   - monitor wbc, leucocytosis resolved,   - urine GC/chlamydia negative   - pending midline, will need ertapenem until 4/21/23, cbc, cmp once while on abx.       Will sign off, please call with questions.       Garett Kidd  Please contact through MS Teams   If no response or past 5 pm/weekend call [phone]. 28 y/o F PMHx SLE (receiving IVIG, no other active meds), initially presented on 4/9 with R sided flank/back pain. Found to have R pyelonephritis. Now called back for admission as blood culture growing ESBL e.coli.     Afebrile, HD stable  WBC 14K today  Urine Culture (4/9):  e.coli  Blood Cultures (4/9):  e.coli 1 bottle, CTX-M detected  CT Abd/Pelv (4/9):  acute R pyelonephritis and ureteritis      Impression:  #ESBL E.Coli Bacteremia   #Pyelonephritis  #Fever, Leukocytosis, resolved     clinically significantly improved.       Recs:  - continue with ertapenem 1g IV q24H  - repeat blood cultures NTD   - monitor wbc, leucocytosis resolved,   - urine GC/chlamydia negative   - pending midline, will need ertapenem until 4/21/23, cbc, cmp once while on abx.       Will sign off, please call with questions.       Garett Kidd  Please contact through MS Teams   If no response or past 5 pm/weekend call [phone]. 28 y/o F PMHx SLE (receiving IVIG, no other active meds), initially presented on 4/9 with R sided flank/back pain. Found to have R pyelonephritis. Now called back for admission as blood culture growing ESBL e.coli.     Afebrile, HD stable  WBC 14K today  Urine Culture (4/9):  e.coli  Blood Cultures (4/9):  e.coli 1 bottle, CTX-M detected  CT Abd/Pelv (4/9):  acute R pyelonephritis and ureteritis      Impression:  #ESBL E.Coli Bacteremia   #Pyelonephritis  #Fever, Leukocytosis, resolved     clinically significantly improved.       Recs:  - continue with ertapenem 1g IV q24H  - repeat blood cultures NTD   - monitor wbc, leucocytosis resolved,   - urine GC/chlamydia negative   - pending midline, will need ertapenem until 4/21/23, cbc, cmp once while on abx.       Will sign off, please call with questions.       Garett Kidd  Please contact through MS Teams   If no response or past 5 pm/weekend call [phone].

## 2023-04-13 NOTE — DISCHARGE NOTE PROVIDER - CARE PROVIDER_API CALL
PMD,   Phone: (   )    -  Fax: (   )    -  Follow Up Time: 1 week   Meghan Leon (DO)  [facility]  [address], [address], [place] [address]  Phone: [phone]  Fax: [phone]  Established Patient  Follow Up Time: 1 week   Meghan Leon (DO)  [facility]  [address], [place] [address]  Phone: [phone]  Fax: [phone]  Established Patient  Follow Up Time: 1 week   Meghan Leon (DO)  [facility]  [address], [place] [address]  Phone: [phone]  Fax: [phone]  Established Patient  Follow Up Time: 1 week

## 2023-04-13 NOTE — DISCHARGE NOTE PROVIDER - NSDCMRMEDTOKEN_GEN_ALL_CORE_FT
Artificial Tears ophthalmic solution: 1 drop(s) in each eye as needed  cefpodoxime 200 mg oral tablet: 1 tab(s) orally 2 times a day  Daily Vinny oral tablet: 1 tab(s) orally once a day  ertapenem 1 g injection: 1 gram(s) intravenously once a day  methocarbamol 500 mg oral tablet: 1 tab(s) orally as needed   Artificial Tears ophthalmic solution: 1 drop(s) in each eye as needed  Daily Vinny oral tablet: 1 tab(s) orally once a day  ertapenem 1 g injection: 1 gram(s) intravenously once a day  Labs: CBC, CMP in 4 days: F/U PMD or Labs

## 2023-04-13 NOTE — ADVANCED PRACTICE NURSE CONSULT - REASON FOR CONSULT
Midline Catheter Insertion Note    Catheter type: 4F  : Bard  Power injectable: Yes  Lot #[id number]                                                                                                                                                                                                                        Procedure assisted by: SANTO Quintero RN  Time out was preformed, confirming the patient's first and last name, date of birth, procedure, and correct site prior to state of procedure.    Patient was placed with HOB 30 degrees. Patient placement site was prepped with chlorhexidine solution, then draped using maximum sterile barrier protection. The area was injected [id number] of 1% Lidocaine. Using the Bard Site Rite 8, the catheter was placed using the Modified Seldinger Technique. Strict adherence to outline aseptic technique including handwashing, glove and gown, utilizing mask and cap, plus draping the patient with a sterile drape was observed. Upon completion of line placement, the insertion site was covered with a sterile occlusive CHG dressing. Pt tolerated procedure well.     All materials used for catheter insertion, including the intact guide wires, were accounted for at the end of the procedure.  Number of attempts: 1  Complications/Comments: None    Emergency Placement: No  Site: New  Anatomical Site of insertion: Right Basilic  Catheter size/length: 4F, 20cm  US guided Bard single lumen power midline placed   Midline Catheter Insertion Note    Catheter type: 4F  : Bard  Power injectable: Yes  Lot #[id number]                                                                                                                                                                                                                        Procedure assisted by: SANTO Quintero RN  Time out was preformed, confirming the patient's first and last name, date of birth, procedure, and correct site prior to state of procedure.    Patient was placed with HOB 30 degrees. Patient placement site was prepped with chlorhexidine solution, then draped using maximum sterile barrier protection. The area was injected [id number] of 1% Lidocaine. Using the Bard Site Rite 8, the catheter was placed using the Modified Seldinger Technique. Strict adherence to outline aseptic technique including handwashing, glove and gown, utilizing mask and cap, plus draping the patient with a sterile drape was observed. Upon completion of line placement, the insertion site was covered with a sterile occlusive CHG dressing. Pt tolerated procedure well.     All materials used for catheter insertion, including the intact guide wires, were accounted for at the end of the procedure.  Number of attempts: 1  Complications/Comments: None    Emergency Placement: No  Site: New  Anatomical Site of insertion: Right Basilic  Catheter size/length: 4F, 20cm  US guided Bard single lumen power midline placed   Midline Catheter Insertion Note    Catheter type: 4F  : Bard  Power injectable: Yes  Lot #[id number]                                                                                                                                                                                                                        Procedure assisted by: SANTO Quintero RN  Time out was preformed, confirming the patient's first and last name, date of birth, procedure, and correct site prior to state of procedure.    Patient was placed with HOB 30 degrees. Patient placement site was prepped with chlorhexidine solution, then draped using maximum sterile barrier protection. The area was injected [id number] of 1% Lidocaine. Using the Bard Site Rite 8, the catheter was placed using the Modified Seldinger Technique. Strict adherence to outline aseptic technique including handwashing, glove and gown, utilizing mask and cap, plus draping the patient with a sterile drape was observed. Upon completion of line placement, the insertion site was covered with a sterile occlusive CHG dressing. Pt tolerated procedure well.     All materials used for catheter insertion, including the intact guide wires, were accounted for at the end of the procedure.  Number of attempts: 1  Complications/Comments: None    Emergency Placement: No  Site: New  Anatomical Site of insertion: Right Basilic  Catheter size/length: 4F, 20cm  US guided Bard single lumen power midline placed

## 2023-04-14 VITALS
OXYGEN SATURATION: 99 % | SYSTOLIC BLOOD PRESSURE: 119 MMHG | TEMPERATURE: 99 F | DIASTOLIC BLOOD PRESSURE: 77 MMHG | RESPIRATION RATE: 18 BRPM | HEART RATE: 69 BPM

## 2023-04-14 LAB
APPEARANCE UR: ABNORMAL
BACTERIA # UR AUTO: NEGATIVE — SIGNIFICANT CHANGE UP
BILIRUB UR-MCNC: NEGATIVE — SIGNIFICANT CHANGE UP
COLOR SPEC: SIGNIFICANT CHANGE UP
CULTURE RESULTS: SIGNIFICANT CHANGE UP
DIFF PNL FLD: NEGATIVE — SIGNIFICANT CHANGE UP
EPI CELLS # UR: 20 /HPF — HIGH
GLUCOSE UR QL: NEGATIVE — SIGNIFICANT CHANGE UP
GRAN CASTS # UR COMP ASSIST: 1 /LPF — SIGNIFICANT CHANGE UP
HYALINE CASTS # UR AUTO: 0 /LPF — SIGNIFICANT CHANGE UP (ref 0–7)
KETONES UR-MCNC: NEGATIVE — SIGNIFICANT CHANGE UP
LEUKOCYTE ESTERASE UR-ACNC: ABNORMAL
NITRITE UR-MCNC: NEGATIVE — SIGNIFICANT CHANGE UP
PH UR: 6.5 — SIGNIFICANT CHANGE UP (ref 5–8)
PROT UR-MCNC: NEGATIVE — SIGNIFICANT CHANGE UP
RBC CASTS # UR COMP ASSIST: 5 /HPF — HIGH (ref 0–4)
SP GR SPEC: 1.01 — SIGNIFICANT CHANGE UP (ref 1.01–1.02)
SPECIMEN SOURCE: SIGNIFICANT CHANGE UP
UROBILINOGEN FLD QL: NEGATIVE — SIGNIFICANT CHANGE UP
WBC UR QL: 12 /HPF — HIGH (ref 0–5)

## 2023-04-14 PROCEDURE — 76770 US EXAM ABDO BACK WALL COMP: CPT

## 2023-04-14 PROCEDURE — 80053 COMPREHEN METABOLIC PANEL: CPT

## 2023-04-14 PROCEDURE — 85730 THROMBOPLASTIN TIME PARTIAL: CPT

## 2023-04-14 PROCEDURE — 99239 HOSP IP/OBS DSCHRG MGMT >30: CPT

## 2023-04-14 PROCEDURE — 84295 ASSAY OF SERUM SODIUM: CPT

## 2023-04-14 PROCEDURE — 87637 SARSCOV2&INF A&B&RSV AMP PRB: CPT

## 2023-04-14 PROCEDURE — 96375 TX/PRO/DX INJ NEW DRUG ADDON: CPT

## 2023-04-14 PROCEDURE — 82330 ASSAY OF CALCIUM: CPT

## 2023-04-14 PROCEDURE — 85025 COMPLETE CBC W/AUTO DIFF WBC: CPT

## 2023-04-14 PROCEDURE — 36415 COLL VENOUS BLD VENIPUNCTURE: CPT

## 2023-04-14 PROCEDURE — 87591 N.GONORRHOEAE DNA AMP PROB: CPT

## 2023-04-14 PROCEDURE — 85014 HEMATOCRIT: CPT

## 2023-04-14 PROCEDURE — 84132 ASSAY OF SERUM POTASSIUM: CPT

## 2023-04-14 PROCEDURE — 85018 HEMOGLOBIN: CPT

## 2023-04-14 PROCEDURE — 87040 BLOOD CULTURE FOR BACTERIA: CPT

## 2023-04-14 PROCEDURE — 82435 ASSAY OF BLOOD CHLORIDE: CPT

## 2023-04-14 PROCEDURE — 82947 ASSAY GLUCOSE BLOOD QUANT: CPT

## 2023-04-14 PROCEDURE — 81001 URINALYSIS AUTO W/SCOPE: CPT

## 2023-04-14 PROCEDURE — 36569 INSJ PICC 5 YR+ W/O IMAGING: CPT

## 2023-04-14 PROCEDURE — 82803 BLOOD GASES ANY COMBINATION: CPT

## 2023-04-14 PROCEDURE — 87491 CHLMYD TRACH DNA AMP PROBE: CPT

## 2023-04-14 PROCEDURE — 85610 PROTHROMBIN TIME: CPT

## 2023-04-14 PROCEDURE — 83735 ASSAY OF MAGNESIUM: CPT

## 2023-04-14 PROCEDURE — 83605 ASSAY OF LACTIC ACID: CPT

## 2023-04-14 PROCEDURE — 71045 X-RAY EXAM CHEST 1 VIEW: CPT

## 2023-04-14 PROCEDURE — 80048 BASIC METABOLIC PNL TOTAL CA: CPT

## 2023-04-14 PROCEDURE — 84100 ASSAY OF PHOSPHORUS: CPT

## 2023-04-14 PROCEDURE — 85027 COMPLETE CBC AUTOMATED: CPT

## 2023-04-14 PROCEDURE — C1751: CPT

## 2023-04-14 PROCEDURE — 99285 EMERGENCY DEPT VISIT HI MDM: CPT | Mod: 25

## 2023-04-14 PROCEDURE — 96374 THER/PROPH/DIAG INJ IV PUSH: CPT

## 2023-04-14 PROCEDURE — 71045 X-RAY EXAM CHEST 1 VIEW: CPT | Mod: 26

## 2023-04-14 PROCEDURE — 87086 URINE CULTURE/COLONY COUNT: CPT

## 2023-04-14 RX ADMIN — Medication 650 MILLIGRAM(S): at 09:29

## 2023-04-14 RX ADMIN — Medication 650 MILLIGRAM(S): at 10:20

## 2023-04-14 RX ADMIN — ERTAPENEM SODIUM 120 MILLIGRAM(S): 1 INJECTION, POWDER, LYOPHILIZED, FOR SOLUTION INTRAMUSCULAR; INTRAVENOUS at 10:38

## 2023-04-14 NOTE — PROGRESS NOTE ADULT - PROBLEM SELECTOR PLAN 2
-see above   -pain control with toradol for now, would avoid opioids if possible given patient has been constipated for over a week, bowel regimen ordered
-see above   -pain control with toradol
-see above   -pain control with toradol

## 2023-04-14 NOTE — PROGRESS NOTE ADULT - REASON FOR ADMISSION
bacteremia 2/2 pyelonephritis

## 2023-04-14 NOTE — PROGRESS NOTE ADULT - SUBJECTIVE AND OBJECTIVE BOX
[facility] Division of Hospital Medicine  Geremias Olivo DO  Pager ([place], 4X-1T): [phone]  Other Times:  [phone]    Patient is a 27y old  Female who presents with a chief complaint of bacteremia 2/2 pyelonephritis (2023 18:42)    SUBJECTIVE / OVERNIGHT EVENTS: Patient had low grade temp of 100.6 but resolved. No other acute events. Patient seen and examined at bedside this morning, feels well, ready to go home.    REVIEW OF SYSTEMS:    CONSTITUTIONAL: No weakness, did have low grade overnight fever  EYES/ENT: No visual changes;  No vertigo or throat pain   NECK: No pain or stiffness  RESPIRATORY: No cough, wheezing, hemoptysis; No shortness of breath  CARDIOVASCULAR: No chest pain or palpitations  GASTROINTESTINAL: No abdominal or epigastric pain. No nausea, vomiting, or hematemesis; No diarrhea or constipation. No melena or hematochezia.  NEUROLOGICAL: No numbness or weakness  SKIN: No itching, burning, rashes, or lesions  MSK: No joint pain, no back pain  HEME: No easy bleeding, no easy bruising  All other review of systems is negative unless indicated above.    MEDICATIONS  (STANDING):  ertapenem  IVPB      ertapenem  IVPB 1000 milliGRAM(s) IV Intermittent every 24 hours  polyethylene glycol 3350 17 Gram(s) Oral daily  senna 2 Tablet(s) Oral at bedtime    MEDICATIONS  (PRN):  acetaminophen     Tablet .. 650 milliGRAM(s) Oral every 6 hours PRN Temp greater or equal to 38C (100.4F), Mild Pain (1 - 3)  aluminum hydroxide/magnesium hydroxide/simethicone Suspension 30 milliLiter(s) Oral every 4 hours PRN Dyspepsia  ketorolac   Injectable 15 milliGRAM(s) IV Push every 6 hours PRN Moderate Pain (4 - 6)  ketorolac   Injectable 30 milliGRAM(s) IV Push every 6 hours PRN Severe Pain (7 - 10)  melatonin 3 milliGRAM(s) Oral at bedtime PRN Insomnia  ondansetron Injectable 4 milliGRAM(s) IV Push every 8 hours PRN Nausea and/or Vomiting      CAPILLARY BLOOD GLUCOSE        I&O's Summary    2023 07:01  -  2023 07:00  --------------------------------------------------------  IN: 240 mL / OUT: 0 mL / NET: 240 mL        PHYSICAL EXAM:  Vital Signs Last 24 Hrs  T(C): 36.9 (2023 04:05), Max: 38.1 (2023 22:44)  T(F): 98.4 (2023 04:05), Max: 100.6 (2023 22:44)  HR: 60 (2023 04:05) (48 - 60)  BP: 110/69 (2023 04:05) (110/69 - 128/78)  BP(mean): --  RR: 18 (2023 04:05) (18 - 18)  SpO2: 96% (2023 04:05) (96% - 98%)    Parameters below as of 2023 04:05  Patient On (Oxygen Delivery Method): room air    CONSTITUTIONAL: NAD, well-developed, well-groomed  EYES: EOMI; conjunctiva and sclera clear  ENMT: Moist oral mucosa, normal dentition  PSYCH: A+O to person, place, and time; affect appropriate  NEUROLOGY: No focal deficits   SKIN: No rashes; no palpable lesions    LABS:                        12.0   7.50  )-----------( 247      ( 2023 07:17 )             36.9     04-13    139  |  105  |  7   ----------------------------<  96  4.4   |  26  |  0.62    Ca    9.2      2023 07:17  Phos  3.7     04-13  Mg     2.2     04-13    TPro  6.0  /  Alb  3.4  /  TBili  0.2  /  DBili  x   /  AST  24  /  ALT  19  /  AlkPhos  86  04-13          Urinalysis Basic - ( 2023 11:20 )    Color: Light Yellow / Appearance: Slightly Turbid / S.010 / pH: x  Gluc: x / Ketone: Negative  / Bili: Negative / Urobili: Negative   Blood: x / Protein: Negative / Nitrite: Negative   Leuk Esterase: Moderate / RBC: 5 /hpf / WBC 12 /HPF   Sq Epi: x / Non Sq Epi: x / Bacteria: Negative   [facility] Division of Hospital Medicine  Geremias Olivo DO  Pager ([facility], 2Q-1I): [phone]  Other Times:  [phone]    Patient is a 27y old  Female who presents with a chief complaint of bacteremia 2/2 pyelonephritis (2023 18:42)    SUBJECTIVE / OVERNIGHT EVENTS: Patient had low grade temp of 100.6 but resolved. No other acute events. Patient seen and examined at bedside this morning, feels well, ready to go home.    REVIEW OF SYSTEMS:    CONSTITUTIONAL: No weakness, did have low grade overnight fever  EYES/ENT: No visual changes;  No vertigo or throat pain   NECK: No pain or stiffness  RESPIRATORY: No cough, wheezing, hemoptysis; No shortness of breath  CARDIOVASCULAR: No chest pain or palpitations  GASTROINTESTINAL: No abdominal or epigastric pain. No nausea, vomiting, or hematemesis; No diarrhea or constipation. No melena or hematochezia.  NEUROLOGICAL: No numbness or weakness  SKIN: No itching, burning, rashes, or lesions  MSK: No joint pain, no back pain  HEME: No easy bleeding, no easy bruising  All other review of systems is negative unless indicated above.    MEDICATIONS  (STANDING):  ertapenem  IVPB      ertapenem  IVPB 1000 milliGRAM(s) IV Intermittent every 24 hours  polyethylene glycol 3350 17 Gram(s) Oral daily  senna 2 Tablet(s) Oral at bedtime    MEDICATIONS  (PRN):  acetaminophen     Tablet .. 650 milliGRAM(s) Oral every 6 hours PRN Temp greater or equal to 38C (100.4F), Mild Pain (1 - 3)  aluminum hydroxide/magnesium hydroxide/simethicone Suspension 30 milliLiter(s) Oral every 4 hours PRN Dyspepsia  ketorolac   Injectable 15 milliGRAM(s) IV Push every 6 hours PRN Moderate Pain (4 - 6)  ketorolac   Injectable 30 milliGRAM(s) IV Push every 6 hours PRN Severe Pain (7 - 10)  melatonin 3 milliGRAM(s) Oral at bedtime PRN Insomnia  ondansetron Injectable 4 milliGRAM(s) IV Push every 8 hours PRN Nausea and/or Vomiting      CAPILLARY BLOOD GLUCOSE        I&O's Summary    2023 07:01  -  2023 07:00  --------------------------------------------------------  IN: 240 mL / OUT: 0 mL / NET: 240 mL        PHYSICAL EXAM:  Vital Signs Last 24 Hrs  T(C): 36.9 (2023 04:05), Max: 38.1 (2023 22:44)  T(F): 98.4 (2023 04:05), Max: 100.6 (2023 22:44)  HR: 60 (2023 04:05) (48 - 60)  BP: 110/69 (2023 04:05) (110/69 - 128/78)  BP(mean): --  RR: 18 (2023 04:05) (18 - 18)  SpO2: 96% (2023 04:05) (96% - 98%)    Parameters below as of 2023 04:05  Patient On (Oxygen Delivery Method): room air    CONSTITUTIONAL: NAD, well-developed, well-groomed  EYES: EOMI; conjunctiva and sclera clear  ENMT: Moist oral mucosa, normal dentition  PSYCH: A+O to person, place, and time; affect appropriate  NEUROLOGY: No focal deficits   SKIN: No rashes; no palpable lesions    LABS:                        12.0   7.50  )-----------( 247      ( 2023 07:17 )             36.9     04-13    139  |  105  |  7   ----------------------------<  96  4.4   |  26  |  0.62    Ca    9.2      2023 07:17  Phos  3.7     04-13  Mg     2.2     04-13    TPro  6.0  /  Alb  3.4  /  TBili  0.2  /  DBili  x   /  AST  24  /  ALT  19  /  AlkPhos  86  04-13          Urinalysis Basic - ( 2023 11:20 )    Color: Light Yellow / Appearance: Slightly Turbid / S.010 / pH: x  Gluc: x / Ketone: Negative  / Bili: Negative / Urobili: Negative   Blood: x / Protein: Negative / Nitrite: Negative   Leuk Esterase: Moderate / RBC: 5 /hpf / WBC 12 /HPF   Sq Epi: x / Non Sq Epi: x / Bacteria: Negative   [facility] Division of Hospital Medicine  Geremias Olivo DO  Pager ([place], 8B-1M): [phone]  Other Times:  [phone]    Patient is a 27y old  Female who presents with a chief complaint of bacteremia 2/2 pyelonephritis (2023 18:42)    SUBJECTIVE / OVERNIGHT EVENTS: Patient had low grade temp of 100.6 but resolved. No other acute events. Patient seen and examined at bedside this morning, feels well, ready to go home.    REVIEW OF SYSTEMS:    CONSTITUTIONAL: No weakness, did have low grade overnight fever  EYES/ENT: No visual changes;  No vertigo or throat pain   NECK: No pain or stiffness  RESPIRATORY: No cough, wheezing, hemoptysis; No shortness of breath  CARDIOVASCULAR: No chest pain or palpitations  GASTROINTESTINAL: No abdominal or epigastric pain. No nausea, vomiting, or hematemesis; No diarrhea or constipation. No melena or hematochezia.  NEUROLOGICAL: No numbness or weakness  SKIN: No itching, burning, rashes, or lesions  MSK: No joint pain, no back pain  HEME: No easy bleeding, no easy bruising  All other review of systems is negative unless indicated above.    MEDICATIONS  (STANDING):  ertapenem  IVPB      ertapenem  IVPB 1000 milliGRAM(s) IV Intermittent every 24 hours  polyethylene glycol 3350 17 Gram(s) Oral daily  senna 2 Tablet(s) Oral at bedtime    MEDICATIONS  (PRN):  acetaminophen     Tablet .. 650 milliGRAM(s) Oral every 6 hours PRN Temp greater or equal to 38C (100.4F), Mild Pain (1 - 3)  aluminum hydroxide/magnesium hydroxide/simethicone Suspension 30 milliLiter(s) Oral every 4 hours PRN Dyspepsia  ketorolac   Injectable 15 milliGRAM(s) IV Push every 6 hours PRN Moderate Pain (4 - 6)  ketorolac   Injectable 30 milliGRAM(s) IV Push every 6 hours PRN Severe Pain (7 - 10)  melatonin 3 milliGRAM(s) Oral at bedtime PRN Insomnia  ondansetron Injectable 4 milliGRAM(s) IV Push every 8 hours PRN Nausea and/or Vomiting      CAPILLARY BLOOD GLUCOSE        I&O's Summary    2023 07:01  -  2023 07:00  --------------------------------------------------------  IN: 240 mL / OUT: 0 mL / NET: 240 mL        PHYSICAL EXAM:  Vital Signs Last 24 Hrs  T(C): 36.9 (2023 04:05), Max: 38.1 (2023 22:44)  T(F): 98.4 (2023 04:05), Max: 100.6 (2023 22:44)  HR: 60 (2023 04:05) (48 - 60)  BP: 110/69 (2023 04:05) (110/69 - 128/78)  BP(mean): --  RR: 18 (2023 04:05) (18 - 18)  SpO2: 96% (2023 04:05) (96% - 98%)    Parameters below as of 2023 04:05  Patient On (Oxygen Delivery Method): room air    CONSTITUTIONAL: NAD, well-developed, well-groomed  EYES: EOMI; conjunctiva and sclera clear  ENMT: Moist oral mucosa, normal dentition  PSYCH: A+O to person, place, and time; affect appropriate  NEUROLOGY: No focal deficits   SKIN: No rashes; no palpable lesions    LABS:                        12.0   7.50  )-----------( 247      ( 2023 07:17 )             36.9     04-13    139  |  105  |  7   ----------------------------<  96  4.4   |  26  |  0.62    Ca    9.2      2023 07:17  Phos  3.7     04-13  Mg     2.2     04-13    TPro  6.0  /  Alb  3.4  /  TBili  0.2  /  DBili  x   /  AST  24  /  ALT  19  /  AlkPhos  86  04-13          Urinalysis Basic - ( 2023 11:20 )    Color: Light Yellow / Appearance: Slightly Turbid / S.010 / pH: x  Gluc: x / Ketone: Negative  / Bili: Negative / Urobili: Negative   Blood: x / Protein: Negative / Nitrite: Negative   Leuk Esterase: Moderate / RBC: 5 /hpf / WBC 12 /HPF   Sq Epi: x / Non Sq Epi: x / Bacteria: Negative

## 2023-04-14 NOTE — PROGRESS NOTE ADULT - PROBLEM SELECTOR PLAN 1
Bacteremia 2/2 pyelonephritis, CT 4/9 showed R sided pyelo and ureteritis  -Urine and Blood cultures from 4/9 showed ESBL E coli  -ID consulted, continue Ertapenem through 4/21  -repeat blood and urine cultures NGTD  -Renal sono without abscess  -Has midline to complete course of Ertapenem

## 2023-04-14 NOTE — PROGRESS NOTE ADULT - PROBLEM SELECTOR PLAN 4
Diet: Regular  DVT: low risk, encourage ambulation  Dispo: home
Diet: Regular  DVT: low risk, encourage ambulation  Dispo: pending midline and abx set up
Diet: Regular  DVT: low risk, encourage ambulation  Dispo: pending improvement in clinical condition

## 2023-04-14 NOTE — DISCHARGE NOTE NURSING/CASE MANAGEMENT/SOCIAL WORK - PATIENT PORTAL LINK FT
You can access the FollowMyHealth Patient Portal offered by [facility] by registering at the following website: [email or web address]. By joining [employer]’s FollowMyHealth portal, you will also be able to view your health information using other applications (apps) compatible with our system. You can access the FollowMyHealth Patient Portal offered by [facility] by registering at the following website: [email or web address]. By joining [employer]’s FollowMyHealth portal, you will also be able to view your health information using other applications (apps) compatible with our system. You can access the FollowMyHealth Patient Portal offered by [facility] by registering at the following website: [email or web address]. By joining [employer]’s FollowMyHealth portal, you will also be able to view your health information using other applications (apps) compatible with our system.

## 2023-04-14 NOTE — DISCHARGE NOTE NURSING/CASE MANAGEMENT/SOCIAL WORK - NSDCPEFALRISK_GEN_ALL_CORE
For information on Fall & Injury Prevention, visit: [email or web address] OR  [email or web address]-injury OR  https://www.cdc.gov/steadi/patient.html For information on Fall & Injury Prevention, visit: [email or web address] OR  [email or web address]/[email or web address]-avoid-injury OR  https://www.cdc.gov/steadi/patient.html For information on Fall & Injury Prevention, visit: [email or web address] OR  [email or web address]-injury OR  https://www.cdc.gov/steadi/patient.html

## 2023-04-14 NOTE — PROGRESS NOTE ADULT - NSPROGADDITIONALINFOA_GEN_ALL_CORE
Patient medically clear for DC with midline and IV antibiotics. Discussed with patient, partner, [facility] ACP. Total time spent discharge planning 41 minutes. Patient medically clear for DC with midline and IV antibiotics. Discussed with patient, partner, [facility] ACP. Total time spent discharge planning 41 minutes. Patient medically clear for DC with midline and IV antibiotics. Discussed with patient, partner, [facility]. Total time spent discharge planning 41 minutes.

## 2023-04-16 LAB
CULTURE RESULTS: SIGNIFICANT CHANGE UP
SPECIMEN SOURCE: SIGNIFICANT CHANGE UP

## 2023-04-19 LAB
CULTURE RESULTS: SIGNIFICANT CHANGE UP
SPECIMEN SOURCE: SIGNIFICANT CHANGE UP

## 2023-12-21 RX ORDER — METHOCARBAMOL 500 MG/1
1 TABLET, FILM COATED ORAL
Refills: 0 | DISCHARGE